# Patient Record
Sex: MALE | Race: WHITE | NOT HISPANIC OR LATINO | Employment: FULL TIME | URBAN - METROPOLITAN AREA
[De-identification: names, ages, dates, MRNs, and addresses within clinical notes are randomized per-mention and may not be internally consistent; named-entity substitution may affect disease eponyms.]

---

## 2017-02-23 ENCOUNTER — HOSPITAL ENCOUNTER (OUTPATIENT)
Dept: RADIOLOGY | Facility: HOSPITAL | Age: 52
Discharge: HOME | End: 2017-02-23
Attending: INTERNAL MEDICINE
Payer: COMMERCIAL

## 2017-02-23 ENCOUNTER — OFFICE VISIT (OUTPATIENT)
Dept: PULMONOLOGY | Facility: CLINIC | Age: 52
End: 2017-02-23
Payer: COMMERCIAL

## 2017-02-23 VITALS
OXYGEN SATURATION: 98 % | HEIGHT: 72 IN | DIASTOLIC BLOOD PRESSURE: 82 MMHG | WEIGHT: 222.88 LBS | SYSTOLIC BLOOD PRESSURE: 138 MMHG | HEART RATE: 84 BPM | RESPIRATION RATE: 12 BRPM | BODY MASS INDEX: 30.19 KG/M2

## 2017-02-23 DIAGNOSIS — R91.1 LUNG NODULE: ICD-10-CM

## 2017-02-23 DIAGNOSIS — J45.20 CHRONIC ASTHMA, MILD INTERMITTENT, UNCOMPLICATED: Primary | ICD-10-CM

## 2017-02-23 DIAGNOSIS — J30.89 PERENNIAL ALLERGIC RHINITIS, UNSPECIFIED ALLERGIC RHINITIS TRIGGER: ICD-10-CM

## 2017-02-23 PROCEDURE — 1160F RVW MEDS BY RX/DR IN RCRD: CPT | Mod: S$GLB,,, | Performed by: INTERNAL MEDICINE

## 2017-02-23 PROCEDURE — 99999 PR PBB SHADOW E&M-EST. PATIENT-LVL III: CPT | Mod: PBBFAC,,, | Performed by: INTERNAL MEDICINE

## 2017-02-23 PROCEDURE — 99213 OFFICE O/P EST LOW 20 MIN: CPT | Mod: S$GLB,,, | Performed by: INTERNAL MEDICINE

## 2017-02-23 PROCEDURE — 71250 CT THORAX DX C-: CPT | Mod: 26,,, | Performed by: RADIOLOGY

## 2017-02-23 PROCEDURE — 71250 CT THORAX DX C-: CPT | Mod: TC

## 2017-02-23 NOTE — PATIENT INSTRUCTIONS
No further CT imaging of chest unless new resp symptoms arise.  Continue Albuterol if needed; no indication for maintenance asthma med at present.  Continue current meds for control of rhinitis.  Return visit if needed.

## 2017-02-23 NOTE — PROGRESS NOTES
Subjective:       Patient ID: Faustino Shook is a 51 y.o. male.    Chief Complaint: Pulmonary Nodules    HPI HISTORY OF PRESENT ILLNESS:  Mr. Shook is a 51-year-old nonsmoker who returns   for followup of a small right lung nodule.  He reports having had a stable   respiratory status since his visit here in August.  He did have a flare of acute   sinusitis around December of last year.  At that time, he was treated with   Augmentin.  He feels that he had a good clinical response.  He has used   albuterol on an as needed schedule for control of intermittent asthma.  He has   not needed this medication during the past several months.  He is   not having any ongoing nighttime respiratory symptoms.  He has not had any   recent upper GI problems.    DATA:  I have reviewed the images from the CT scan of the chest done earlier   today.  There do not appear to be any acute cardiovascular abnormalities.  There   continues to be an oval opacity in the right middle lobe, which is slightly   less than 1 cm in largest diameter.  This appears stable when compared to the   prior CT studies from July of last year and March 2014.  There also is a   localized subpleural streaking lateral to this nodule in the right lung.  This   appears stable on these serial scans also.  Elsewhere smaller subpleural   micronodules are seen, but none show any significant change.      CB/HN  dd: 02/23/2017 20:07:57 (CST)  td: 02/24/2017 11:03:08 (CST)  Doc ID   #[id number]  Job ID #[id number]    CC:       Review of Systems    Objective:      Physical Exam  Personal Diagnostic Review    Pulmonary Function Tests 2/23/2017   SpO2 98   Height 72.000   Weight 3566.16   BMI (Calculated) 30.3         Assessment:       1. Chronic asthma, mild intermittent, uncomplicated    2. Lung nodule    3. Perennial allergic rhinitis, unspecified allergic rhinitis trigger        Outpatient Encounter Prescriptions as of 2/23/2017   Medication Sig Dispense Refill     albuterol (PROVENTIL) 2.5 mg /3 mL (0.083 %) nebulizer solution Take 3 mLs (2.5 mg total) by nebulization every 6 (six) hours as needed for Wheezing. 50 Box 1    albuterol 90 mcg/actuation inhaler Inhale 2 puffs into the lungs every 6 (six) hours as needed. 18 g 1    albuterol sulfate 2.5 mg/0.5 mL Nebu Take 2.5 mg by nebulization every 6 (six) hours.      chlorproMAZINE (THORAZINE) 25 MG tablet Take 1-2 tablets (25-50 mg total) by mouth 3 (three) times daily. Stop taking when hiccups stop occuring. 60 tablet 0    esomeprazole (NEXIUM) 40 MG capsule Take 1 capsule (40 mg total) by mouth before breakfast. For Acid Reflux. 30 capsule 1    fluticasone (FLONASE) 50 mcg/actuation nasal spray 2 sprays by Each Nare route once daily. 16 g 11    ipratropium (ATROVENT) 0.06 % nasal spray 2 sprays by Nasal route as directed.       No facility-administered encounter medications on file as of 2/23/2017.      No orders of the defined types were placed in this encounter.    Plan:     No further CT imaging of chest unless new resp symptoms arise.  Continue Albuterol if needed; no indication for maintenance asthma med at present.  Continue current meds for control of rhinitis.  Return visit if needed.    NOTE:  16 min visit with all time counseling regarding CT findings and asthma management.

## 2017-03-24 ENCOUNTER — OFFICE VISIT (OUTPATIENT)
Dept: INTERNAL MEDICINE | Facility: CLINIC | Age: 52
End: 2017-03-24
Payer: COMMERCIAL

## 2017-03-24 VITALS
HEART RATE: 74 BPM | WEIGHT: 236.13 LBS | TEMPERATURE: 98 F | BODY MASS INDEX: 31.98 KG/M2 | SYSTOLIC BLOOD PRESSURE: 130 MMHG | HEIGHT: 72 IN | DIASTOLIC BLOOD PRESSURE: 64 MMHG | OXYGEN SATURATION: 97 %

## 2017-03-24 DIAGNOSIS — J06.9 URI WITH COUGH AND CONGESTION: Primary | ICD-10-CM

## 2017-03-24 DIAGNOSIS — J45.20 CHRONIC ASTHMA, MILD INTERMITTENT, UNCOMPLICATED: ICD-10-CM

## 2017-03-24 PROCEDURE — 1160F RVW MEDS BY RX/DR IN RCRD: CPT | Mod: S$GLB,,, | Performed by: INTERNAL MEDICINE

## 2017-03-24 PROCEDURE — 99999 PR PBB SHADOW E&M-EST. PATIENT-LVL III: CPT | Mod: PBBFAC,,, | Performed by: INTERNAL MEDICINE

## 2017-03-24 PROCEDURE — 99214 OFFICE O/P EST MOD 30 MIN: CPT | Mod: S$GLB,,, | Performed by: INTERNAL MEDICINE

## 2017-03-24 RX ORDER — NEOMYCIN SULFATE, POLYMYXIN B SULFATE AND DEXAMETHASONE 3.5; 10000; 1 MG/ML; [USP'U]/ML; MG/ML
SUSPENSION/ DROPS OPHTHALMIC
Refills: 0 | COMMUNITY
Start: 2017-03-13 | End: 2018-08-21

## 2017-03-24 RX ORDER — BENZONATATE 100 MG/1
100 CAPSULE ORAL 3 TIMES DAILY PRN
Qty: 30 CAPSULE | Refills: 0 | Status: SHIPPED | OUTPATIENT
Start: 2017-03-24 | End: 2017-04-03

## 2017-03-24 NOTE — MR AVS SNAPSHOT
[facility] - Internal Medicine  [address]  [place] [address]  Phone: [phone]  Fax: [phone]                  Faustino Shook   3/24/2017 11:00 AM   Office Visit    Description:  Male : 1965   Provider:  Steph Laureano MD   Department:  [facility] - Internal Medicine           Reason for Visit     URI           Diagnoses this Visit        Comments    URI with cough and congestion    -  Primary            To Do List           Goals (5 Years of Data)     None      Follow-Up and Disposition     Return if symptoms worsen or fail to improve.       These Medications        Disp Refills Start End    benzonatate (TESSALON) 100 MG capsule 30 capsule 0 3/24/2017 4/3/2017    Take 1 capsule (100 mg total) by mouth 3 (three) times daily as needed for Cough. - Oral    Pharmacy: [facility]/pharmacy #[id number] - [place] - [address] [facility] #: [phone]         [facility]     [facility] Care [facility] -  Assistance  Registered nurses in the [facility] provide clinical advisement, health education, appointment booking, and other advisory services.  Call for this free service at [phone].             Medications           Message regarding Medications     Verify the changes and/or additions to your medication regime listed below are the same as discussed with your clinician today.  If any of these changes or additions are incorrect, please notify your healthcare provider.        START taking these NEW medications        Refills    benzonatate (TESSALON) 100 MG capsule 0    Sig: Take 1 capsule (100 mg total) by mouth 3 (three) times daily as needed for Cough.    Class: Normal    Route: Oral           Verify that the below list of medications is an accurate representation of the medications you are currently taking.  If none reported, the list may be blank. If incorrect, please contact your healthcare provider. Carry this list with you in case of  emergency.           Current Medications     albuterol (PROVENTIL) 2.5 mg /3 mL (0.083 %) nebulizer solution Take 3 mLs (2.5 mg total) by nebulization every 6 (six) hours as needed for Wheezing.    albuterol 90 mcg/actuation inhaler Inhale 2 puffs into the lungs every 6 (six) hours as needed.    albuterol sulfate 2.5 mg/0.5 mL Nebu Take 2.5 mg by nebulization every 6 (six) hours.    chlorproMAZINE (THORAZINE) 25 MG tablet Take 1-2 tablets (25-50 mg total) by mouth 3 (three) times daily. Stop taking when hiccups stop occuring.    fluticasone (FLONASE) 50 mcg/actuation nasal spray 2 sprays by Each Nare route once daily.    ipratropium (ATROVENT) 0.06 % nasal spray 2 sprays by Nasal route as directed.    neomycin-polymyxin-dexamethasone (MAXITROL) 3.5mg/mL-10,000 unit/mL-0.1 % DrpS PUT 1 DROP INTO LEFT EYE 4 TIMES A DAY    benzonatate (TESSALON) 100 MG capsule Take 1 capsule (100 mg total) by mouth 3 (three) times daily as needed for Cough.    esomeprazole (NEXIUM) 40 MG capsule Take 1 capsule (40 mg total) by mouth before breakfast. For Acid Reflux.           Clinical Reference Information           Your Vitals Were     BP Pulse Temp Height Weight SpO2    130/64 (BP Location: Left arm, Patient Position: Sitting) 74 98.1 °F (36.7 °C) 6' (1.829 m) 107.1 kg (236 lb 1.8 oz) 97%    BMI                32.02 kg/m2          Blood Pressure          Most Recent Value    BP  130/64      Allergies as of 3/24/2017     No Known Allergies      Immunizations Administered on Date of Encounter - 3/24/2017     None      Instructions    Nasal decongestant:  1. Sudafed (pseudoephedrine or phenylephrine), 2. Afrin nasal spray (oxymetazoline) - do not use nasal spray longer than 3 days, 3. Sinus rinses    Cough suppressant:  1. Dextromethorphan, 2. Also sending Tessalon Perles to your pharmacy.    Expectorant:  Guaifenesin     Good combination pill options:  Revert and NyQuil       Language Assistance Services     ATTENTION: Language  assistance services are available, free of charge. Please call [phone].      ATENCIÓN: Si habla octavia, tiene a riddle disposición servicios gratuitos de asistencia lingüística. Llame al [phone].     CHÚ Ý: N?u b?n nói Ti?ng Vi?t, có các d?ch v? h? tr? ngôn ng? mi?n phí dành cho b?n. G?i s? [phone].         [facility] complies with applicable Federal civil rights laws and does not discriminate on the basis of race, color, national origin, age, disability, or sex.

## 2017-03-24 NOTE — PROGRESS NOTES
Internal Medicine    Subjective:      Patient ID: Faustino Shook is a 51 y.o. male.    Chief Complaint: URI    HPI Comments: Presents for urgent visit for cough and cold symptoms.  Has mild intermittent asthma.      URI with cough:  All his children have been sick recently and were given Z-packs by Urgent Care.  3 days ago the patient started having cold symptoms - sinus congestion, runny nose, cough, and mild SOB.  He has mild intermittent asthma, and always has to start using his inhaler when he gets sick.  He is using albuterol 4 times per day with good relief and no shortness of breath or wheezing currently.  He did feel feverish last night but did not check his temperature.  He is using Flonase and Zyrtec without much relief.  He denies ear pain, sore throat, throat swelling, post-nasal drip, or purulent nasal discharge.      Review of Systems   Constitutional: Negative for appetite change, chills, fatigue, fever and unexpected weight change.   HENT: Positive for congestion. Negative for ear pain, hearing loss, postnasal drip, rhinorrhea, sinus pressure, sneezing, sore throat and trouble swallowing.    Eyes: Negative for visual disturbance.   Respiratory: Positive for cough (Produtive with some yellow sputum). Negative for chest tightness, shortness of breath and wheezing.    Cardiovascular: Negative for chest pain, palpitations and leg swelling.   Gastrointestinal: Negative for abdominal pain.   Skin: Negative for rash.   Neurological: Negative for dizziness, weakness, numbness and headaches.   Hematological: Negative for adenopathy.   Psychiatric/Behavioral: Negative for behavioral problems.       Past medical history, surgical history, and family medical history reviewed and updated as appropriate.    Medications and allergies reviewed.     Objective:     /64 (BP Location: Left arm, Patient Position: Sitting)  Pulse 74  Temp 98.1 °F (36.7 °C)  Ht 6' (1.829 m)  Wt 107.1 kg (236 lb 1.8 oz)   SpO2 97%  BMI 32.02 kg/m2  Physical Exam   Constitutional: He is oriented to person, place, and time. He appears well-developed and well-nourished. No distress.   HENT:   Head: Normocephalic and atraumatic.   TM's clear bilaterally.  External ear canals clear.  Oropharynx with slight erythema bilaterally but no swelling or tonsillar exudate.   Eyes: Conjunctivae and EOM are normal.   Neck: No thyromegaly present.   Cardiovascular: Normal rate and regular rhythm.  Exam reveals no gallop and no friction rub.    No murmur heard.  Pulmonary/Chest: Effort normal and breath sounds normal. No respiratory distress. He has no wheezes. He has no rales.   Musculoskeletal: He exhibits no edema or tenderness.   Lymphadenopathy:     He has no cervical adenopathy.   Neurological: He is alert and oriented to person, place, and time.   Skin: No rash noted. No erythema.   Psychiatric: He has a normal mood and affect. His behavior is normal.       RESULTS:  Lab Results   Component Value Date    WBC 3.60 (L) 07/06/2016    HGB 16.2 07/06/2016    HCT 44.6 07/06/2016    MCV 84 07/06/2016     (L) 07/06/2016     BMP  Lab Results   Component Value Date     07/06/2016    K 3.5 07/06/2016     07/06/2016    CO2 26 07/06/2016    BUN 12 07/06/2016    CREATININE 1.2 07/06/2016    CALCIUM 9.0 07/06/2016    ANIONGAP 11 07/06/2016    ESTGFRAFRICA >60.0 07/06/2016    EGFRNONAA >60.0 07/06/2016         Assessment:     1. URI with cough and congestion    2. Chronic asthma, mild intermittent, uncomplicated        Plan:   Faustino was seen today for uri.    Diagnoses and all orders for this visit:    URI with cough and congestion   Likely viral in origin.  Recommended over-the-counter options for symptom relief.  Patient agrees to notify me if symptoms worsen.  He will be leaving for [place] next Thursday.  -     benzonatate (TESSALON) 100 MG capsule; Take 1 capsule (100 mg total) by mouth 3 (three) times daily as needed for  Cough.    Chronic asthma, mild intermittent, uncomplicated    Worsening of symptoms due to current viral illness.  No shortness of breath or wheezing today.  Sating well on room air.  Continue albuterol as needed.    Return if symptoms worsen or fail to improve.    Steph Laureano MD  Internal Medicine  [facility]

## 2017-03-24 NOTE — PATIENT INSTRUCTIONS
Nasal decongestant:  1. Sudafed (pseudoephedrine or phenylephrine), 2. Afrin nasal spray (oxymetazoline) - do not use nasal spray longer than 3 days, 3. Sinus rinses    Cough suppressant:  1. Dextromethorphan, 2. Also sending Tessalon Perles to your pharmacy.    Expectorant:  Guaifenesin     Good combination pill options:  DayQil and NyQuil

## 2017-05-04 ENCOUNTER — TELEPHONE (OUTPATIENT)
Dept: OTOLARYNGOLOGY | Facility: CLINIC | Age: 52
End: 2017-05-04

## 2017-05-04 ENCOUNTER — OFFICE VISIT (OUTPATIENT)
Dept: OTOLARYNGOLOGY | Facility: CLINIC | Age: 52
End: 2017-05-04
Payer: COMMERCIAL

## 2017-05-04 VITALS
BODY MASS INDEX: 31.99 KG/M2 | SYSTOLIC BLOOD PRESSURE: 158 MMHG | DIASTOLIC BLOOD PRESSURE: 75 MMHG | WEIGHT: 235.88 LBS

## 2017-05-04 DIAGNOSIS — K13.79 ELONGATED UVULA, ACQUIRED: ICD-10-CM

## 2017-05-04 DIAGNOSIS — H61.23 IMPACTED CERUMEN OF BOTH EARS: ICD-10-CM

## 2017-05-04 DIAGNOSIS — G47.30 SLEEP-DISORDERED BREATHING: ICD-10-CM

## 2017-05-04 DIAGNOSIS — J34.2 DEVIATED SEPTUM: ICD-10-CM

## 2017-05-04 DIAGNOSIS — J34.3 NASAL TURBINATE HYPERTROPHY: ICD-10-CM

## 2017-05-04 DIAGNOSIS — K14.8 ENLARGED TONGUE: ICD-10-CM

## 2017-05-04 DIAGNOSIS — H93.8X3 SENSATION OF FULLNESS IN BOTH EARS: Primary | ICD-10-CM

## 2017-05-04 PROCEDURE — 69210 REMOVE IMPACTED EAR WAX UNI: CPT | Mod: S$GLB,,, | Performed by: NURSE PRACTITIONER

## 2017-05-04 PROCEDURE — 99999 PR PBB SHADOW E&M-EST. PATIENT-LVL III: CPT | Mod: PBBFAC,,, | Performed by: NURSE PRACTITIONER

## 2017-05-04 PROCEDURE — 1160F RVW MEDS BY RX/DR IN RCRD: CPT | Mod: S$GLB,,, | Performed by: NURSE PRACTITIONER

## 2017-05-04 PROCEDURE — 99213 OFFICE O/P EST LOW 20 MIN: CPT | Mod: 25,S$GLB,, | Performed by: NURSE PRACTITIONER

## 2017-05-04 RX ORDER — AMOXICILLIN AND CLAVULANATE POTASSIUM 875; 125 MG/1; MG/1
1 TABLET, FILM COATED ORAL 2 TIMES DAILY
Refills: 0 | COMMUNITY
Start: 2017-03-29 | End: 2018-08-21 | Stop reason: ALTCHOICE

## 2017-05-04 RX ORDER — CLINDAMYCIN HYDROCHLORIDE 300 MG/1
300 CAPSULE ORAL 3 TIMES DAILY
Refills: 0 | COMMUNITY
Start: 2017-03-29 | End: 2018-08-21

## 2017-05-04 NOTE — MR AVS SNAPSHOT
[facility] - Otorhinolaryngology  [address]  [place] [address]  Phone: [phone]  Fax: [phone]                  Faustino Shook   2017 3:45 PM   Office Visit    Description:  Male : 1965   Provider:  Samy Doty NP   Department:  [facility] - Otorhinolaryngology           Reason for Visit     Cerumen Impaction     Ear Fullness           Diagnoses this Visit        Comments    Sensation of fullness in both ears    -  Primary     Impacted cerumen of both ears         Sleep-disordered breathing         Deviated septum         Nasal turbinate hypertrophy         Elongated uvula, acquired         Enlarged tongue                To Do List           Goals (5 Years of Data)     None      Follow-Up and Disposition     Return if symptoms worsen or fail to improve.      [facility]     [facility] On Call Nurse Care Line -  Assistance  Unless otherwise directed by your provider, please contact [facility], our nurse care line that is available for  assistance.     Registered nurses in the [facility] provide: appointment scheduling, clinical advisement, health education, and other advisory services.  Call: [phone] (toll free)               Medications           Message regarding Medications     Verify the changes and/or additions to your medication regime listed below are the same as discussed with your clinician today.  If any of these changes or additions are incorrect, please notify your healthcare provider.             Verify that the below list of medications is an accurate representation of the medications you are currently taking.  If none reported, the list may be blank. If incorrect, please contact your healthcare provider. Carry this list with you in case of emergency.           Current Medications     albuterol (PROVENTIL) 2.5 mg /3 mL (0.083 %) nebulizer solution Take 3 mLs (2.5 mg total) by nebulization every 6 (six) hours as needed  for Wheezing.    albuterol 90 mcg/actuation inhaler Inhale 2 puffs into the lungs every 6 (six) hours as needed.    albuterol sulfate 2.5 mg/0.5 mL Nebu Take 2.5 mg by nebulization every 6 (six) hours.    amoxicillin-clavulanate 875-125mg (AUGMENTIN) 875-125 mg per tablet Take 1 tablet by mouth 2 (two) times daily.    chlorproMAZINE (THORAZINE) 25 MG tablet Take 1-2 tablets (25-50 mg total) by mouth 3 (three) times daily. Stop taking when hiccups stop occuring.    clindamycin (CLEOCIN) 300 MG capsule 300 mg 3 (three) times daily.    esomeprazole (NEXIUM) 40 MG capsule Take 1 capsule (40 mg total) by mouth before breakfast. For Acid Reflux.    fluticasone (FLONASE) 50 mcg/actuation nasal spray 2 sprays by Each Nare route once daily.    ipratropium (ATROVENT) 0.06 % nasal spray 2 sprays by Nasal route as directed.    neomycin-polymyxin-dexamethasone (MAXITROL) 3.5mg/mL-10,000 unit/mL-0.1 % DrpS PUT 1 DROP INTO LEFT EYE 4 TIMES A DAY           Clinical Reference Information           Your Vitals Were     BP Weight BMI          158/75 (BP Location: Right arm, Patient Position: Sitting, BP Method: Automatic) 107 kg (235 lb 14.3 oz) 31.99 kg/m2        Blood Pressure          Most Recent Value    BP  (!)  158/75      Allergies as of 5/4/2017     No Known Allergies      Immunizations Administered on Date of Encounter - 5/4/2017     None      Instructions    Referred to Dr. Mckeon for snoring/sleep apnea.  Hearing conservation strongly recommended.  F/U with PCP as per schedule.  RTC prn.         Language Assistance Services     ATTENTION: Language assistance services are available, free of charge. Please call [phone].      ATENCIÓN: Si tessla octavia, tiene a riddle disposición servicios gratuitos de asistencia lingüística. Llame al [phone].     CHÚ Ý: N?u b?n nói Ti?ng Vi?t, có các d?ch v? h? tr? ngôn ng? mi?n phí dành cho b?n. G?i s? [phone].         [facility] complies with applicable  Federal civil rights laws and does not discriminate on the basis of race, color, national origin, age, disability, or sex.

## 2017-05-04 NOTE — PATIENT INSTRUCTIONS
Referred to Dr. Mckeon for snoring/sleep apnea.  Hearing conservation strongly recommended.  F/U with PCP as per schedule.  RTC prn.

## 2017-05-04 NOTE — TELEPHONE ENCOUNTER
----- Message from Tessy Marie sent at 5/4/2017  9:56 AM CDT -----  Contact: Pt [phone]  Pt called to speak to the nurse to request an appt with the provider today due to wax build up, ringing in his ear and echoing. Pt has seen the provider previously and would like a call back from the nurse.    Pt can be reached [phone].    Thanks

## 2017-05-06 NOTE — PROGRESS NOTES
Subjective:       Patient ID: Faustino Shook is a 52 y.o. male.    Chief Complaint: Cerumen Impaction and Ear Fullness    Ear Fullness    There is pain in both ears. This is a recurrent problem. The current episode started more than 1 year ago. The problem occurs constantly. There has been no fever. The pain is at a severity of 0/10. The patient is experiencing no pain. Associated symptoms include hearing loss. Pertinent negatives include no abdominal pain, coughing, diarrhea, ear discharge, headaches, neck pain, rash, rhinorrhea, sore throat or vomiting. He has tried nothing for the symptoms. His past medical history is significant for hearing loss. There is no history of a chronic ear infection or a tympanostomy tube.        Past Medical History: Patient has a past medical history of Allergy; Asthma; and Trauma to right eye (2007).    Past Surgical History: Patient has a past surgical history that includes Knee surgery (Right).    Social History: Patient reports that he has never smoked. He has never used smokeless tobacco. He reports that he does not drink alcohol.    Family History: family history includes Asthma in his mother; No Known Problems in his brother, father, maternal aunt, maternal grandfather, maternal grandmother, maternal uncle, paternal aunt, paternal grandfather, paternal grandmother, paternal uncle, and sister. There is no history of Amblyopia, Blindness, Cancer, Cataracts, Diabetes, Glaucoma, Hypertension, Macular degeneration, Retinal detachment, Strabismus, Stroke, Thyroid disease, or Emphysema.    Medications:   Current Outpatient Prescriptions   Medication Sig    albuterol (PROVENTIL) 2.5 mg /3 mL (0.083 %) nebulizer solution Take 3 mLs (2.5 mg total) by nebulization every 6 (six) hours as needed for Wheezing.    albuterol 90 mcg/actuation inhaler Inhale 2 puffs into the lungs every 6 (six) hours as needed.    albuterol sulfate 2.5 mg/0.5 mL Nebu Take 2.5 mg by nebulization  every 6 (six) hours.    amoxicillin-clavulanate 875-125mg (AUGMENTIN) 875-125 mg per tablet Take 1 tablet by mouth 2 (two) times daily.    chlorproMAZINE (THORAZINE) 25 MG tablet Take 1-2 tablets (25-50 mg total) by mouth 3 (three) times daily. Stop taking when hiccups stop occuring.    clindamycin (CLEOCIN) 300 MG capsule 300 mg 3 (three) times daily.    esomeprazole (NEXIUM) 40 MG capsule Take 1 capsule (40 mg total) by mouth before breakfast. For Acid Reflux.    fluticasone (FLONASE) 50 mcg/actuation nasal spray 2 sprays by Each Nare route once daily.    ipratropium (ATROVENT) 0.06 % nasal spray 2 sprays by Nasal route as directed.    neomycin-polymyxin-dexamethasone (MAXITROL) 3.5mg/mL-10,000 unit/mL-0.1 % DrpS PUT 1 DROP INTO LEFT EYE 4 TIMES A DAY     No current facility-administered medications for this visit.        Allergies: Patient has No Known Allergies.    Review of Systems   Constitutional: Negative for activity change, fatigue, fever and unexpected weight change.   HENT: Positive for hearing loss. Negative for congestion, dental problem, ear discharge, ear pain, facial swelling, mouth sores, nosebleeds, postnasal drip, rhinorrhea, sinus pressure, sneezing, sore throat, tinnitus, trouble swallowing and voice change.    Eyes: Negative for pain and visual disturbance.   Respiratory: Negative for cough, choking, chest tightness, shortness of breath, wheezing and stridor.         Snoring.   Cardiovascular: Negative for chest pain.   Gastrointestinal: Negative for abdominal pain, diarrhea, nausea and vomiting.   Musculoskeletal: Negative for gait problem, neck pain and neck stiffness.   Skin: Negative for color change, rash and wound.   Allergic/Immunologic: Negative for environmental allergies.   Neurological: Negative for dizziness, seizures, syncope, facial asymmetry, speech difficulty, weakness, light-headedness, numbness and headaches.   Psychiatric/Behavioral: Negative for agitation, confusion  and decreased concentration. The patient is not nervous/anxious.        Objective:       BP (!) 158/75 (BP Location: Right arm, Patient Position: Sitting, BP Method: Automatic)  Wt 107 kg (235 lb 14.3 oz)  BMI 31.99 kg/m2    Physical Exam   Constitutional: He is oriented to person, place, and time. He appears well-developed and well-nourished.   HENT:   Head: Normocephalic and atraumatic. Not macrocephalic and not microcephalic. Head is without raccoon's eyes, without Arriaga's sign, without abrasion, without contusion, without laceration, without right periorbital erythema and without left periorbital erythema. Hair is normal.   Right Ear: No lacerations. No drainage, swelling or tenderness. No foreign bodies. No mastoid tenderness. Tympanic membrane is not injected, not scarred, not perforated, not erythematous, not retracted and not bulging. Tympanic membrane mobility is normal. No middle ear effusion. No hemotympanum. Decreased hearing is noted.   Left Ear: No lacerations. No drainage, swelling or tenderness. No foreign bodies. No mastoid tenderness. Tympanic membrane is not injected, not scarred, not perforated, not erythematous, not retracted and not bulging. Tympanic membrane mobility is normal.  No middle ear effusion. No hemotympanum. Decreased hearing is noted.   Nose: Septal deviation present. No mucosal edema, rhinorrhea, nose lacerations, sinus tenderness, nasal deformity or nasal septal hematoma. No epistaxis.  No foreign bodies. Right sinus exhibits no maxillary sinus tenderness and no frontal sinus tenderness. Left sinus exhibits no maxillary sinus tenderness and no frontal sinus tenderness.   Mouth/Throat: Uvula is midline.     PROCEDURE NOTE:  Ceruminosis is noted in both EACs.  Wax was removed by manual debridement and suctioning utilizing the assistance of binocular microscopy revealing EACs and TMs WNL. The patient tolerated this procedure without difficulty. The subjective decrease noted in  hearing pre-cleaning was resolved post-cleaning.       Eyes: Conjunctivae, EOM and lids are normal. Pupils are equal, round, and reactive to light.   Neck: Trachea normal and normal range of motion. Neck supple. No spinous process tenderness and no muscular tenderness present. No rigidity. No edema, no erythema and normal range of motion present. No thyroid mass and no thyromegaly present.   Pulmonary/Chest: Effort normal.   Abdominal: Soft.   Musculoskeletal: Normal range of motion.   Lymphadenopathy:        Head (right side): No submental, no submandibular, no tonsillar, no preauricular and no posterior auricular adenopathy present.        Head (left side): No submental, no submandibular, no tonsillar, no preauricular, no posterior auricular and no occipital adenopathy present.     He has no cervical adenopathy.   Neurological: He is alert and oriented to person, place, and time. No cranial nerve deficit or sensory deficit.   Skin: Skin is warm and dry.   Psychiatric: He has a normal mood and affect. His behavior is normal. Judgment and thought content normal.   Nursing note and vitals reviewed.        Assessment:       1. Sensation of fullness in both ears    2. Impacted cerumen of both ears    3. Sleep-disordered breathing    4. Deviated septum    5. Nasal turbinate hypertrophy    6. Elongated uvula, acquired    7. Enlarged tongue        Plan:       Schedule appt. With Dr. Mckeon (snoring, deviated septum)  Hearing conservation strongly recommended.  Trial of amplification is not currently indicated.  Re-check of hearing after 52 years of age.  F/U with PCP as per schedule.  RTC prn.

## 2017-05-25 ENCOUNTER — OFFICE VISIT (OUTPATIENT)
Dept: OTOLARYNGOLOGY | Facility: CLINIC | Age: 52
End: 2017-05-25
Payer: COMMERCIAL

## 2017-05-25 VITALS
BODY MASS INDEX: 31.26 KG/M2 | HEIGHT: 73 IN | WEIGHT: 235.88 LBS | SYSTOLIC BLOOD PRESSURE: 156 MMHG | DIASTOLIC BLOOD PRESSURE: 82 MMHG

## 2017-05-25 DIAGNOSIS — H69.91 EUSTACHIAN TUBE DYSFUNCTION, RIGHT: ICD-10-CM

## 2017-05-25 DIAGNOSIS — H93.8X1 EAR PRESSURE, RIGHT: Primary | ICD-10-CM

## 2017-05-25 PROCEDURE — 99213 OFFICE O/P EST LOW 20 MIN: CPT | Mod: S$GLB,,, | Performed by: NURSE PRACTITIONER

## 2017-05-25 PROCEDURE — 99999 PR PBB SHADOW E&M-EST. PATIENT-LVL III: CPT | Mod: PBBFAC,,, | Performed by: NURSE PRACTITIONER

## 2017-05-25 RX ORDER — PREDNISONE 20 MG/1
20 TABLET ORAL DAILY
Qty: 5 TABLET | Refills: 0 | Status: SHIPPED | OUTPATIENT
Start: 2017-05-25 | End: 2017-05-30

## 2017-05-25 NOTE — PATIENT INSTRUCTIONS
Politzerization attempted but unsuccessful.  Prednisone 20 mg po x5 days.  OTC Flonase BID (spray laterally).  Patient Deferred Audiogram.  Ears and Altitude pamphlet provided.  F/U with PCP as per schedule.  RTC prn or sooner.

## 2017-05-30 ENCOUNTER — HOSPITAL ENCOUNTER (EMERGENCY)
Facility: HOSPITAL | Age: 52
Discharge: HOME | End: 2017-05-30
Attending: EMERGENCY MEDICINE
Payer: COMMERCIAL

## 2017-05-30 VITALS
BODY MASS INDEX: 28.44 KG/M2 | HEART RATE: 66 BPM | SYSTOLIC BLOOD PRESSURE: 129 MMHG | OXYGEN SATURATION: 98 % | WEIGHT: 210 LBS | HEIGHT: 72 IN | TEMPERATURE: 98 F | DIASTOLIC BLOOD PRESSURE: 60 MMHG | RESPIRATION RATE: 14 BRPM

## 2017-05-30 DIAGNOSIS — R61 DIAPHORESIS: ICD-10-CM

## 2017-05-30 DIAGNOSIS — H81.10 BPPV (BENIGN PAROXYSMAL POSITIONAL VERTIGO), UNSPECIFIED LATERALITY: Primary | ICD-10-CM

## 2017-05-30 LAB
ALBUMIN SERPL BCP-MCNC: 3.7 G/DL
ALP SERPL-CCNC: 54 U/L
ALT SERPL W/O P-5'-P-CCNC: 48 U/L
ANION GAP SERPL CALC-SCNC: 11 MMOL/L
AST SERPL-CCNC: 22 U/L
BASOPHILS # BLD AUTO: 0.02 K/UL
BASOPHILS NFR BLD: 0.2 %
BILIRUB SERPL-MCNC: 0.6 MG/DL
BUN SERPL-MCNC: 12 MG/DL
CALCIUM SERPL-MCNC: 8.9 MG/DL
CHLORIDE SERPL-SCNC: 103 MMOL/L
CO2 SERPL-SCNC: 24 MMOL/L
CREAT SERPL-MCNC: 1 MG/DL
DIFFERENTIAL METHOD: ABNORMAL
EOSINOPHIL # BLD AUTO: 0.1 K/UL
EOSINOPHIL NFR BLD: 1.2 %
ERYTHROCYTE [DISTWIDTH] IN BLOOD BY AUTOMATED COUNT: 13.8 %
EST. GFR  (AFRICAN AMERICAN): >60 ML/MIN/1.73 M^2
EST. GFR  (NON AFRICAN AMERICAN): >60 ML/MIN/1.73 M^2
GLUCOSE SERPL-MCNC: 128 MG/DL
HCT VFR BLD AUTO: 51.6 %
HGB BLD-MCNC: 18.2 G/DL
LYMPHOCYTES # BLD AUTO: 0.9 K/UL
LYMPHOCYTES NFR BLD: 10.4 %
MCH RBC QN AUTO: 30.4 PG
MCHC RBC AUTO-ENTMCNC: 35.3 %
MCV RBC AUTO: 86 FL
MONOCYTES # BLD AUTO: 0.5 K/UL
MONOCYTES NFR BLD: 5.1 %
NEUTROPHILS # BLD AUTO: 7.5 K/UL
NEUTROPHILS NFR BLD: 82.9 %
PLATELET # BLD AUTO: 168 K/UL
PMV BLD AUTO: 9.6 FL
POTASSIUM SERPL-SCNC: 3.7 MMOL/L
PROT SERPL-MCNC: 6.8 G/DL
RBC # BLD AUTO: 5.98 M/UL
SODIUM SERPL-SCNC: 138 MMOL/L
TROPONIN I SERPL DL<=0.01 NG/ML-MCNC: 0.02 NG/ML
WBC # BLD AUTO: 9.04 K/UL

## 2017-05-30 PROCEDURE — 25000003 PHARM REV CODE 250: Performed by: PHYSICIAN ASSISTANT

## 2017-05-30 PROCEDURE — 85025 COMPLETE CBC W/AUTO DIFF WBC: CPT

## 2017-05-30 PROCEDURE — 96360 HYDRATION IV INFUSION INIT: CPT

## 2017-05-30 PROCEDURE — 99285 EMERGENCY DEPT VISIT HI MDM: CPT | Mod: ,,, | Performed by: PHYSICIAN ASSISTANT

## 2017-05-30 PROCEDURE — 99284 EMERGENCY DEPT VISIT MOD MDM: CPT | Mod: 25

## 2017-05-30 PROCEDURE — 93005 ELECTROCARDIOGRAM TRACING: CPT

## 2017-05-30 PROCEDURE — 93010 ELECTROCARDIOGRAM REPORT: CPT | Mod: ,,, | Performed by: INTERNAL MEDICINE

## 2017-05-30 PROCEDURE — 84484 ASSAY OF TROPONIN QUANT: CPT

## 2017-05-30 PROCEDURE — 80053 COMPREHEN METABOLIC PANEL: CPT

## 2017-05-30 RX ORDER — MECLIZINE HYDROCHLORIDE 25 MG/1
25 TABLET ORAL 3 TIMES DAILY PRN
Qty: 20 TABLET | Refills: 0 | Status: SHIPPED | OUTPATIENT
Start: 2017-05-30

## 2017-05-30 RX ORDER — PROMETHAZINE HYDROCHLORIDE 25 MG/1
25 TABLET ORAL
Status: COMPLETED | OUTPATIENT
Start: 2017-05-30 | End: 2017-05-30

## 2017-05-30 RX ORDER — MECLIZINE HCL 12.5 MG 12.5 MG/1
25 TABLET ORAL
Status: COMPLETED | OUTPATIENT
Start: 2017-05-30 | End: 2017-05-30

## 2017-05-30 RX ORDER — PROMETHAZINE HYDROCHLORIDE 25 MG/1
25 TABLET ORAL EVERY 6 HOURS PRN
Qty: 20 TABLET | Refills: 0 | Status: SHIPPED | OUTPATIENT
Start: 2017-05-30 | End: 2018-08-30

## 2017-05-30 RX ADMIN — SODIUM CHLORIDE 1000 ML: 0.9 INJECTION, SOLUTION INTRAVENOUS at 02:05

## 2017-05-30 RX ADMIN — PROMETHAZINE HYDROCHLORIDE 25 MG: 25 TABLET ORAL at 02:05

## 2017-05-30 RX ADMIN — MECLIZINE 25 MG: 12.5 TABLET ORAL at 02:05

## 2017-05-30 NOTE — ED TRIAGE NOTES
52 year old male presents to the ED for evaluation of dizziness, ringing in ears, and nausea. States that he has been having an ongoing inner ear issue that he has seen ENT for, but the symptoms have gotten progressively worse

## 2017-05-30 NOTE — ED PROVIDER NOTES
"Encounter Date: 5/30/2017    SCRIBE #1 NOTE: I, Ernestine Baltazar, am scribing for, and in the presence of,  Dr. James. I have scribed the following portions of the note - the APC attestation and the EKG reading.       History     Chief Complaint   Patient presents with    Dizziness     Pt reports vertigo accompanied by nausea that began around 0000.    Hearing Loss     Pt reports hearing loss x "couple of weeks". Pt has been seeing MD for incident but tonight has gotten worse.    Tinnitus     Pt reports ringing in right ear x "couple of weeks"     Review of patient's allergies indicates:  No Known Allergies  52-year-old male presents to the ER for evaluation of dizziness, nausea, emesis, diaphoresis.  Patient claims a history over the course of the past few weeks of vertigo.  He is being evaluated by ENT, he has been seen by them twice.  He is not sure of the diagnosis that has been given to him by ENT yet but was told that his symptoms are related to vertigo.  He last saw ENT last week and was given a prescription for steroids that of which he did not take.     Patient states that approximately one hour prior to arrival he awoke out of bed sweating, very dizzy, and nauseous.  He did throw up one time.  He denies any chest pain or shortness of breath.  He denies any head trauma.  He denies any change in vision.          Past Medical History:   Diagnosis Date    Allergy     Asthma     Trauma to right eye 2007    poked in the eye while wrestling      Past Surgical History:   Procedure Laterality Date    KNEE SURGERY Right      Family History   Problem Relation Age of Onset    Asthma Mother     No Known Problems Father     No Known Problems Sister     No Known Problems Brother     No Known Problems Maternal Aunt     No Known Problems Maternal Uncle     No Known Problems Paternal Aunt     No Known Problems Paternal Uncle     No Known Problems Maternal Grandmother     No Known Problems Maternal Grandfather  "    No Known Problems Paternal Grandmother     No Known Problems Paternal Grandfather     Amblyopia Neg Hx     Blindness Neg Hx     Cancer Neg Hx     Cataracts Neg Hx     Diabetes Neg Hx     Glaucoma Neg Hx     Hypertension Neg Hx     Macular degeneration Neg Hx     Retinal detachment Neg Hx     Strabismus Neg Hx     Stroke Neg Hx     Thyroid disease Neg Hx     Emphysema Neg Hx      Social History   Substance Use Topics    Smoking status: Never Smoker    Smokeless tobacco: Never Used    Alcohol use No     Review of Systems   Constitutional: Positive for diaphoresis. Negative for fever.   HENT: Negative for sore throat.    Respiratory: Negative for shortness of breath.    Cardiovascular: Negative for chest pain.   Gastrointestinal: Positive for nausea and vomiting.   Genitourinary: Negative for dysuria.   Musculoskeletal: Negative for back pain.   Skin: Negative for rash.   Neurological: Positive for dizziness. Negative for weakness.   Hematological: Does not bruise/bleed easily.       Physical Exam     Initial Vitals [05/30/17 0059]   BP Pulse Resp Temp SpO2   135/88 72 14 97.6 °F (36.4 °C) 95 %     Physical Exam    Constitutional: Vital signs are normal. He appears well-developed and well-nourished. He is not diaphoretic. No distress.   HENT:   Head: Normocephalic and atraumatic.   Right Ear: External ear normal.   Left Ear: External ear normal.   Normal exam of the R ear   Eyes: Conjunctivae are normal.   PERRLA  EOMI  Pt has bilateral vertical Nystagmus   Cardiovascular: Normal rate, regular rhythm and normal heart sounds. Exam reveals no gallop and no friction rub.    No murmur heard.  Pulmonary/Chest: No respiratory distress. He has no wheezes. He has no rhonchi. He has no rales. He exhibits no tenderness.   Abdominal: Soft. Normal appearance, normal aorta and bowel sounds are normal.   Musculoskeletal: Normal range of motion.   Neurological: He is alert and oriented to person, place, and time.    Patient has horizontal nystagmus bilaterally  [place]pike was not attempted, slight alteration in patient's position causes worsening nausea   Skin: Skin is warm and intact.   Psychiatric: He has a normal mood and affect. His speech is normal and behavior is normal. Cognition and memory are normal.         ED Course   Procedures  Labs Reviewed   CBC W/ AUTO DIFFERENTIAL - Abnormal; Notable for the following:        Result Value    Hemoglobin 18.2 (*)     Lymph # 0.9 (*)     Gran% 82.9 (*)     Lymph% 10.4 (*)     All other components within normal limits   COMPREHENSIVE METABOLIC PANEL - Abnormal; Notable for the following:     Glucose 128 (*)     Alkaline Phosphatase 54 (*)     ALT 48 (*)     All other components within normal limits   TROPONIN I     EKG Readings: (Independently Interpreted)   NSR. No ST elevations or depressions. T wave inverisons inferiorly.          Medical Decision Making:   History:   Old Medical Records: I decided to obtain old medical records.  Differential Diagnosis:   BPPV, acute ear infection,   Independently Interpreted Test(s):   I have ordered and independently interpreted EKG Reading(s) - see prior notes  Clinical Tests:   Lab Tests: Ordered and Reviewed  Medical Tests: Ordered  ED Management:  EKG revealed no acute findings  Patient has no acute findings on his lab work  His symptoms are improved somewhat with Phenergan and meclizine  I recommend follow-up with ENT, Rx given for Phenergan and meclizine to be taken 3 times a day when necessary.   Pt has no acute red flags on exam concerning for acute intracranial process. His presentation is consistent with BPPV            Scribe Attestation:   Scribe #1: I performed the above scribed service and the documentation accurately describes the services I performed. I attest to the accuracy of the note.    Attending Attestation:     Physician Attestation Statement for NP/PA:   I discussed this assessment and plan of this patient with the  NP/PA, but I did not personally examine the patient. The face to face encounter was performed by the NP/PA.    Other NP/PA Attestation Additions:    History of Present Illness: Dizziness.          Physician Attestation for Scribe:  Physician Attestation Statement for Scribe #1: I, Dr. James , reviewed documentation, as scribed by Ernestine Baltazar in my presence, and it is both accurate and complete.                 ED Course     Clinical Impression:   The primary encounter diagnosis was BPPV (benign paroxysmal positional vertigo), unspecified laterality. A diagnosis of Diaphoresis was also pertinent to this visit.    Disposition:   Disposition: Discharged  Condition: Stable       Eligio Castillo PA-C  05/30/17 5814       Rudy James III, MD  05/31/17 4414

## 2017-06-06 ENCOUNTER — CLINICAL SUPPORT (OUTPATIENT)
Dept: AUDIOLOGY | Facility: CLINIC | Age: 52
End: 2017-06-06
Payer: COMMERCIAL

## 2017-06-06 ENCOUNTER — OFFICE VISIT (OUTPATIENT)
Dept: OTOLARYNGOLOGY | Facility: CLINIC | Age: 52
End: 2017-06-06
Payer: COMMERCIAL

## 2017-06-06 VITALS
DIASTOLIC BLOOD PRESSURE: 77 MMHG | TEMPERATURE: 98 F | HEIGHT: 72 IN | WEIGHT: 220.25 LBS | SYSTOLIC BLOOD PRESSURE: 129 MMHG | BODY MASS INDEX: 29.83 KG/M2 | HEART RATE: 80 BPM

## 2017-06-06 DIAGNOSIS — H93.11 TINNITUS, RIGHT EAR: ICD-10-CM

## 2017-06-06 DIAGNOSIS — Z88.9 HISTORY OF SEASONAL ALLERGIES: ICD-10-CM

## 2017-06-06 DIAGNOSIS — R42 VERTIGO: ICD-10-CM

## 2017-06-06 DIAGNOSIS — H90.41 SENSORINEURAL HEARING LOSS (SNHL) OF RIGHT EAR WITH UNRESTRICTED HEARING OF LEFT EAR: Primary | ICD-10-CM

## 2017-06-06 DIAGNOSIS — J45.901 CHRONIC ASTHMA, UNSPECIFIED ASTHMA SEVERITY, WITH ACUTE EXACERBATION: ICD-10-CM

## 2017-06-06 DIAGNOSIS — H93.8X1 EAR PRESSURE, RIGHT: ICD-10-CM

## 2017-06-06 PROCEDURE — 92567 TYMPANOMETRY: CPT | Mod: S$GLB,,, | Performed by: AUDIOLOGIST

## 2017-06-06 PROCEDURE — 92557 COMPREHENSIVE HEARING TEST: CPT | Mod: S$GLB,,, | Performed by: AUDIOLOGIST

## 2017-06-06 PROCEDURE — 99213 OFFICE O/P EST LOW 20 MIN: CPT | Mod: S$GLB,,, | Performed by: OTOLARYNGOLOGY

## 2017-06-06 PROCEDURE — 99999 PR PBB SHADOW E&M-EST. PATIENT-LVL I: CPT | Mod: PBBFAC,,,

## 2017-06-06 PROCEDURE — 99999 PR PBB SHADOW E&M-EST. PATIENT-LVL III: CPT | Mod: PBBFAC,,, | Performed by: OTOLARYNGOLOGY

## 2017-06-06 RX ORDER — PRAVASTATIN SODIUM 20 MG/1
TABLET ORAL
COMMUNITY
Start: 2017-06-01 | End: 2018-08-21

## 2017-06-06 NOTE — PROGRESS NOTES
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  He works out frequently but does not lift heavy weights.  He was recently evaluated by our ENT departmental nurse practitioner 5/4/17 for sensation of fullness in both ears.  Cerumen impactions were removed from each ear at that visit.  He was diagnosis with turbinate hypertrophy and a deviated septum referred to Dr. Mckeon.  The patient was again evaluated by the nurse practitioner 5/25/17 for right ear pressure.  The patient recently admits to forcefully blowing his nose.  Politzerization was attempted ; the patient was prescribed a 5 day course of 20 mg prednisone which he did not fill or take.  Indicates a single episode of vertigo which occurred 1 week ago after he was drinking wine (unusual for him).  He was ataxic going to and from the bathroom in the middle of the night.  He continues to complain of a 6 week symptom of right ear pressure and a feeling as if he is on airplane with ear ringing.  He cannot pop his ear open.    He indicates a "buzzing" tinnitus perception at night in a quiet environment specifically.  He does utilize Viagra; he takes Zyrtec-D for seasonal ALLERGIES.  He has a history of asthma and uses albuterol when necessary.  He travels frequently; once while whitewater rafting the water hit his right ear and it felt blocked for a while.    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  He wears hearing protection.  He receives yearly audiometry.    PE:Blood pressure 129/77 pulse 80 temperature 97.6  height 6 feet weight 220 pounds  Gen.: Alert and oriented gentleman in no acute distress  Both ears were examined under the microscope and the micro-procedure room.  Right eardrum is clear and the right middle ear space is well aerated.  Right mastoid is not tender to touch.    The right pinna is not swollen or cellulitic in " appearance.  There is no rash in the right periauricular area.  Left eardrum is clear left middle ear space is well aerated.  Nasal exam is unremarkable for purulent discharge of either passage.  Oropharyngeal exam is unremarkable for inflammation infection ulceration.  Neck examination is within normal limits.    The patient has completed an audiometric study today results of which are duplicated below and reviewed with the patient in detail.            DIAGNOSIS:     ICD-10-CM ICD-9-CM    1. Sensorineural hearing loss (SNHL) of right ear with unrestricted hearing of left ear H90.41 389.15    2. Ear pressure, right H93.8X1 388.8    3. Tinnitus, right ear H93.11 388.30    4. Vertigo R42 780.4     one episode late at at night after drinking wine   5. Chronic asthma, unspecified asthma severity, with acute exacerbation J45.901 493.92    6. History of seasonal allergies Z88.9 V15.09      PLAN: Audiometry reviewed: AD SNHL  Weight lifting, forceful nose blowing discouraged  Low sodium diet may help  Rx for prednisone 10 mg # 21; 40 mg x 2 days, 30 mg x 2 days 20 mg x 2 days 10 mg x 2 days 5 mg x 2 days     all with food  RTC 2 weeks; repeat audiogram  Consider VNG testing re: vertigo pending course  Withdraw use of Viagra/other for now  Continue Zyrtec (D) use + Flonase for now

## 2017-06-06 NOTE — PATIENT INSTRUCTIONS
Audiometry reviewed: AD SNHL  Weight lifting, forceful nose blowing discouraged  Low sodium diet may help  Rx for prednisone 10 mg # 21; 40 mg x 2 days, 30 mg x 2 days 20 mg x 2 days 10 mg x 2 days 5 mg x 2 days     all with food  RTC 2 weeks; repeat audiogram  Consider VNG testing re: vertigo pending course  Withdraw use of Viagra/other for now  Continue Zyrtec (D) use + Flonase for now

## 2017-06-07 NOTE — PROGRESS NOTES
"Subjective:       Patient ID: Faustino Shook is a 52 y.o. male.    Chief Complaint: Cerumen Impaction    HPI   MrEl Shook is a 52 year old male who presents with a 3 week h/o R ear "feels clogged" and "ear popping." Treatments tried include Zyrtec. He has a h/o snorkeling and diving.    Past Medical History: Patient has a past medical history of Allergy; Asthma; and Trauma to right eye (2007).    Past Surgical History: Patient has a past surgical history that includes Knee surgery (Right).    Social History: Patient reports that he has never smoked. He has never used smokeless tobacco. He reports that he does not drink alcohol.    Family History: family history includes Asthma in his mother; No Known Problems in his brother, father, maternal aunt, maternal grandfather, maternal grandmother, maternal uncle, paternal aunt, paternal grandfather, paternal grandmother, paternal uncle, and sister.    Medications:   Current Outpatient Prescriptions   Medication Sig    albuterol (PROVENTIL) 2.5 mg /3 mL (0.083 %) nebulizer solution Take 3 mLs (2.5 mg total) by nebulization every 6 (six) hours as needed for Wheezing.    albuterol 90 mcg/actuation inhaler Inhale 2 puffs into the lungs every 6 (six) hours as needed.    albuterol sulfate 2.5 mg/0.5 mL Nebu Take 2.5 mg by nebulization every 6 (six) hours.    amoxicillin-clavulanate 875-125mg (AUGMENTIN) 875-125 mg per tablet Take 1 tablet by mouth 2 (two) times daily.    chlorproMAZINE (THORAZINE) 25 MG tablet Take 1-2 tablets (25-50 mg total) by mouth 3 (three) times daily. Stop taking when hiccups stop occuring.    clindamycin (CLEOCIN) 300 MG capsule 300 mg 3 (three) times daily.    esomeprazole (NEXIUM) 40 MG capsule Take 1 capsule (40 mg total) by mouth before breakfast. For Acid Reflux.    fluticasone (FLONASE) 50 mcg/actuation nasal spray 2 sprays by Each Nare route once daily.    ipratropium (ATROVENT) 0.06 % nasal spray 2 sprays by " "Nasal route as directed.    meclizine (ANTIVERT) 25 mg tablet Take 1 tablet (25 mg total) by mouth 3 (three) times daily as needed.    neomycin-polymyxin-dexamethasone (MAXITROL) 3.5mg/mL-10,000 unit/mL-0.1 % DrpS PUT 1 DROP INTO LEFT EYE 4 TIMES A DAY    pravastatin (PRAVACHOL) 20 MG tablet     promethazine (PHENERGAN) 25 MG tablet Take 1 tablet (25 mg total) by mouth every 6 (six) hours as needed for Nausea.     No current facility-administered medications for this visit.        Allergies: Patient has No Known Allergies.    Review of Systems   Constitutional: Negative for activity change, fatigue, fever and unexpected weight change.   HENT: Positive for tinnitus. Negative for congestion, dental problem, ear discharge, ear pain, facial swelling, hearing loss, mouth sores, nosebleeds, postnasal drip, rhinorrhea, sinus pressure, sneezing, sore throat, trouble swallowing and voice change.         R ear popping.  Clogged R ear.   Eyes: Negative for pain and visual disturbance.   Respiratory: Negative for cough, choking, chest tightness, shortness of breath, wheezing and stridor.    Cardiovascular: Negative for chest pain.   Gastrointestinal: Negative for nausea and vomiting.   Musculoskeletal: Negative for gait problem, neck pain and neck stiffness.   Skin: Negative for color change, rash and wound.   Allergic/Immunologic: Negative for environmental allergies.   Neurological: Negative for dizziness, seizures, syncope, facial asymmetry, speech difficulty, weakness, light-headedness, numbness and headaches.   Psychiatric/Behavioral: Negative for agitation, confusion and decreased concentration. The patient is not nervous/anxious.        Objective:       BP (!) 156/82 (BP Location: Right arm, Patient Position: Sitting, BP Method: Automatic)   Ht 6' 1" (1.854 m)   Wt 107 kg (235 lb 14.3 oz)   BMI 31.12 kg/m²     Physical Exam   Constitutional: He is oriented to person, place, and time. He appears well-developed and " well-nourished.   HENT:   Head: Normocephalic and atraumatic. Not macrocephalic and not microcephalic. Head is without raccoon's eyes, without Arriaga's sign, without abrasion, without contusion, without laceration, without right periorbital erythema and without left periorbital erythema. Hair is normal.   Right Ear: Tympanic membrane, external ear and ear canal normal. No lacerations. No drainage, swelling or tenderness. No foreign bodies. No mastoid tenderness. Tympanic membrane is not injected, not scarred, not perforated, not erythematous, not retracted and not bulging. Tympanic membrane mobility is normal. No middle ear effusion. No hemotympanum. No decreased hearing is noted.   Left Ear: Tympanic membrane, external ear and ear canal normal. No lacerations. No drainage, swelling or tenderness. No foreign bodies. No mastoid tenderness. Tympanic membrane is not injected, not scarred, not perforated, not erythematous, not retracted and not bulging. Tympanic membrane mobility is normal.  No middle ear effusion. No hemotympanum. No decreased hearing is noted.   Nose: Nose normal. No mucosal edema, rhinorrhea, nose lacerations, sinus tenderness or nasal deformity.   Mouth/Throat: Uvula is midline.   Eyes: Conjunctivae, EOM and lids are normal. Pupils are equal, round, and reactive to light.   Neck: Trachea normal and normal range of motion. Neck supple. No spinous process tenderness and no muscular tenderness present. No no neck rigidity. No edema, no erythema and normal range of motion present. No thyroid mass and no thyromegaly present.   Pulmonary/Chest: Effort normal.   Abdominal: Soft.   Musculoskeletal: Normal range of motion.   Lymphadenopathy:        Head (right side): No submental, no submandibular, no tonsillar, no preauricular and no posterior auricular adenopathy present.        Head (left side): No submental, no submandibular, no tonsillar, no preauricular, no posterior auricular and no occipital  adenopathy present.     He has no cervical adenopathy.   Neurological: He is alert and oriented to person, place, and time. No cranial nerve deficit or sensory deficit.   Skin: Skin is warm and dry.   Psychiatric: He has a normal mood and affect. His behavior is normal. Judgment and thought content normal.   Nursing note and vitals reviewed.      Assessment:       1. Ear pressure, right    2. Eustachian tube dysfunction, right        Plan:       Politzerization attempted but unsuccessful.  Prednisone 20 mg po x 5 days.  OTC Flonase BID (spray laterally).  Patient deferred Audiogram.  Ears and Altitude pamphlet provided.  RTC prn or sooner.

## 2017-07-12 ENCOUNTER — OFFICE VISIT (OUTPATIENT)
Dept: OTOLARYNGOLOGY | Facility: CLINIC | Age: 52
End: 2017-07-12
Payer: COMMERCIAL

## 2017-07-12 ENCOUNTER — CLINICAL SUPPORT (OUTPATIENT)
Dept: AUDIOLOGY | Facility: CLINIC | Age: 52
End: 2017-07-12
Payer: COMMERCIAL

## 2017-07-12 VITALS — SYSTOLIC BLOOD PRESSURE: 133 MMHG | HEART RATE: 86 BPM | TEMPERATURE: 98 F | DIASTOLIC BLOOD PRESSURE: 74 MMHG

## 2017-07-12 DIAGNOSIS — H93.11 TINNITUS, RIGHT EAR: Primary | ICD-10-CM

## 2017-07-12 DIAGNOSIS — H90.41 SENSORINEURAL HEARING LOSS (SNHL) OF RIGHT EAR WITH UNRESTRICTED HEARING OF LEFT EAR: ICD-10-CM

## 2017-07-12 DIAGNOSIS — H81.311: Primary | ICD-10-CM

## 2017-07-12 DIAGNOSIS — R42 DIZZINESS: ICD-10-CM

## 2017-07-12 DIAGNOSIS — Z87.09 HISTORY OF ASTHMA: ICD-10-CM

## 2017-07-12 DIAGNOSIS — H91.91 ACUTE HEARING LOSS, RIGHT: ICD-10-CM

## 2017-07-12 PROCEDURE — 99213 OFFICE O/P EST LOW 20 MIN: CPT | Mod: S$GLB,,, | Performed by: OTOLARYNGOLOGY

## 2017-07-12 PROCEDURE — 99999 PR PBB SHADOW E&M-EST. PATIENT-LVL I: CPT | Mod: PBBFAC,,,

## 2017-07-12 PROCEDURE — 99999 PR PBB SHADOW E&M-EST. PATIENT-LVL III: CPT | Mod: PBBFAC,,, | Performed by: OTOLARYNGOLOGY

## 2017-07-12 PROCEDURE — 92557 COMPREHENSIVE HEARING TEST: CPT | Mod: S$GLB,,, | Performed by: AUDIOLOGIST

## 2017-07-12 NOTE — PROGRESS NOTES
CC: Right ear hearing loss, tinnitus, vertigo  HPI:Mr. Shook is a 52 year old CM who states his right ear hearing was normal yesterday. He can hardly hear out of his right ear today however.    He also indicates a right ear buzzing sensation which kept him up last night.    He has completed an oral course of prednisone (prescribed initially at 40 mg for several days and titrated down from there) during his last visit with me 6/6/17 re: treatment for his right ear hearing loss, single episode of vertigo in right ear tinnitus perception.    The patient indicated a single episode of vertigo which occurred 1 week prior to his first visit with me; he was drinking wine( unusual for him) ;  he felt ataxic to and from the bathroom in the middle the night.  He was examined in the ED 5/30/17 for dizziness and nausea symptoms as well as hearing loss for several weeks duration and ringing perception in the right ear several weeks duration.  He was diagnosed with BPPV, unspecified laterality.  He was prescribed Antivert 25 mg and Phenergan 25 mg at that time.  He felt dizzy in the  middle of dinner at a steak house recently.    He was evaluated by our departmental nurse practitioner 5/25/17 for right ear pressure symptoms.  She had previously examined the patient 5/4/17 presents sensation of fullness in both ears.  Cerumen impactions were extracted from each ear canal.    The patient was encouraged to consult with Dr. MARICRUZ Mckeon for the patient's history of snoring, deviated nasal septum, turbinate hypertrophy, and ligated uvula and large tongue exacerbating possible sleep apnea.    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  He has a younger girl friend.  He utilizes Viagra.    He states there is always significant medical problem is asthma treated with an inhaled medication  on a when necessary basis.  He also has a history of perennial ALLERGIC rhinitis.      PE: Blood pressure 133/74 pulse 86 temperature  98.4  Gen.: Alert and oriented gentleman in no acute distress  Both ears were examined under the microscope and the micro-procedure room.  Right eardrum is clear and the right middle ear space is well aerated.  The patient completed an audiometric study today results of which are duplicated below a compared to the previous study of 6/6/17.        DIAGNOSIS:     ICD-10-CM ICD-9-CM    1. Tinnitus, right ear H93.11 388.30 MRI Brain W WO Contrast   2. Dizziness R42 780.4 MRI Brain W WO Contrast   3. Acute hearing loss, right H91.91 389.9 MRI Brain W WO Contrast    fluctuating; no reponnse to oral steroids   4. History of asthma Z87.09 V12.69      PLAN: Audiometry reviewed; significant multi-frequency AD SNHL;    change in AD hearing compared to 6/6/17 audiometry  pt. indicates excellent hearing yesterday)  Low sodium diet encouraged as before; refer to instruction sheet previously provided  Avoid weight lifting/forceful nose blowing  I recommend consultation with Moshe Troncoso re: cochlear status; possible perfusion candidate   Rx for diazepam 2 mg # 25; take 1-2 po q 6 hours prn dizziness, vertigo, anxiety ( in place of meclizine)   MRI brain/IACS ordered  Avoid Viagra for now

## 2017-07-12 NOTE — PATIENT INSTRUCTIONS
Audiometry reviewed; significant multi-frequency AD SNHL;    change in AD hearing compared to 6/6/17 audiometry  pt. indicates excellent hearing yesterday)  Low sodium diet encouraged as before; refer to instruction sheet previously provided  Avoid weight lifting/forceful nose blowing  I recommend consultation with Moshe Troncoso re: cochlear status; possible perfusion candidate   Rx for diazepam 2 mg # 25; take 1-2 po q 6 hours prn dizziness, vertigo, anxiety ( in place of meclizine)   MRI brain/IACS ordered  Avoid Viagra for now

## 2017-07-20 ENCOUNTER — HOSPITAL ENCOUNTER (OUTPATIENT)
Dept: RADIOLOGY | Facility: HOSPITAL | Age: 52
Discharge: HOME | End: 2017-07-20
Attending: OTOLARYNGOLOGY
Payer: COMMERCIAL

## 2017-07-20 DIAGNOSIS — H93.11 TINNITUS, RIGHT EAR: ICD-10-CM

## 2017-07-20 DIAGNOSIS — R42 DIZZINESS: ICD-10-CM

## 2017-07-20 DIAGNOSIS — H91.91 ACUTE HEARING LOSS, RIGHT: ICD-10-CM

## 2017-07-20 PROCEDURE — 25500020 PHARM REV CODE 255: Performed by: OTOLARYNGOLOGY

## 2017-07-20 PROCEDURE — A9585 GADOBUTROL INJECTION: HCPCS | Performed by: OTOLARYNGOLOGY

## 2017-07-20 PROCEDURE — 70553 MRI BRAIN STEM W/O & W/DYE: CPT | Mod: TC

## 2017-07-20 PROCEDURE — 70553 MRI BRAIN STEM W/O & W/DYE: CPT | Mod: 26,,, | Performed by: RADIOLOGY

## 2017-07-20 RX ORDER — GADOBUTROL 604.72 MG/ML
10 INJECTION INTRAVENOUS
Status: COMPLETED | OUTPATIENT
Start: 2017-07-20 | End: 2017-07-20

## 2017-07-20 RX ADMIN — GADOBUTROL 10 ML: 604.72 INJECTION INTRAVENOUS at 01:07

## 2018-05-01 ENCOUNTER — TELEPHONE (OUTPATIENT)
Dept: OTOLARYNGOLOGY | Facility: CLINIC | Age: 53
End: 2018-05-01

## 2018-05-01 NOTE — TELEPHONE ENCOUNTER
----- Message from Rachel Carney sent at 5/1/2018  3:46 PM CDT -----  Contact: Pt  Pt is calling to speak with nurse in regards to being referred to inner ear specialist from last visit in July 2017.    Pt can be reached at [phone].  Thank you

## 2018-05-28 ENCOUNTER — CLINICAL SUPPORT (OUTPATIENT)
Dept: AUDIOLOGY | Facility: CLINIC | Age: 53
End: 2018-05-28
Payer: COMMERCIAL

## 2018-05-28 ENCOUNTER — OFFICE VISIT (OUTPATIENT)
Dept: OTOLARYNGOLOGY | Facility: CLINIC | Age: 53
End: 2018-05-28
Payer: COMMERCIAL

## 2018-05-28 VITALS — BODY MASS INDEX: 29.62 KG/M2 | HEIGHT: 72 IN | WEIGHT: 218.69 LBS

## 2018-05-28 DIAGNOSIS — H93.11 TINNITUS OF RIGHT EAR: ICD-10-CM

## 2018-05-28 DIAGNOSIS — H90.41 SENSORINEURAL HEARING LOSS (SNHL) OF RIGHT EAR WITH UNRESTRICTED HEARING OF LEFT EAR: Primary | ICD-10-CM

## 2018-05-28 DIAGNOSIS — H81.01 ENDOLYMPHATIC HYDROPS OF RIGHT EAR: Primary | ICD-10-CM

## 2018-05-28 PROCEDURE — 92567 TYMPANOMETRY: CPT | Mod: S$GLB,,, | Performed by: PHYSICIAN ASSISTANT

## 2018-05-28 PROCEDURE — 3008F BODY MASS INDEX DOCD: CPT | Mod: CPTII,S$GLB,, | Performed by: OTOLARYNGOLOGY

## 2018-05-28 PROCEDURE — 92557 COMPREHENSIVE HEARING TEST: CPT | Mod: S$GLB,,, | Performed by: PHYSICIAN ASSISTANT

## 2018-05-28 PROCEDURE — 99999 PR PBB SHADOW E&M-EST. PATIENT-LVL III: CPT | Mod: PBBFAC,,, | Performed by: OTOLARYNGOLOGY

## 2018-05-28 PROCEDURE — 99999 PR PBB SHADOW E&M-EST. PATIENT-LVL I: CPT | Mod: PBBFAC,,,

## 2018-05-28 PROCEDURE — 99214 OFFICE O/P EST MOD 30 MIN: CPT | Mod: S$GLB,,, | Performed by: OTOLARYNGOLOGY

## 2018-05-28 RX ORDER — TRIAMTERENE AND HYDROCHLOROTHIAZIDE 37.5; 25 MG/1; MG/1
1 CAPSULE ORAL EVERY MORNING
Qty: 30 CAPSULE | Refills: 5 | Status: SHIPPED | OUTPATIENT
Start: 2018-05-28 | End: 2021-03-08

## 2018-05-28 RX ORDER — MOMETASONE FUROATE 50 UG/1
2 SPRAY, METERED NASAL DAILY
COMMUNITY
End: 2018-08-30 | Stop reason: SDUPTHER

## 2018-05-28 NOTE — PROGRESS NOTES
Audiological Evaluation:    Test results indicated normal hearing AS with excellent speech discrimination ability and a moderate SNHL AD with fair speech discrimination ability.  Hearing in the right ear has not significantly changed since the last audiogram on 7/2017.  Normal tympanograms were obtained bilaterally.  Mr. Shook complains of tinnitus, fullness and dizziness.  Recommend:  1.  Otologic evaluation  2.  Follow-up hearing evaluations as needed  3.  Noise protection  4.  Consult with audiologist to discuss amplification options to improve communication following medical clearance

## 2018-05-28 NOTE — PROGRESS NOTES
Otology/Neurotology Consultation Report       Chief Complaint: Vertigo    History of Present Illness: 53 y.o.  male presents as a consultation from Dr. Worthington.  He complains of hearing loss, vertigo, and ear fullness that is episodic in nature.  He states he began having ear fullness bilaterally two years ago that was relieved by cerumen cleaning.  He continued to have ear fullness on the right that was intermittent and relieved by autoinsufflation.  Then, one year ago, he began having hearing loss on the right that would be severe, but again was intermittent with reportedly normal hearing between episodes. He states that he had two episodes of vertigo lasting approximately one hour last week that caused him to leave work early.  These resolved completely, but a feeling of ear fullness remained.  He currently feels fullness in the right ear and notes his hearing is greatly decreased in the right ear for the last couple of days.        ROS       Past Medical History: Patient has a past medical history of Allergy; Asthma; and Trauma to right eye (2007).    Past Surgical History: Patient has a past surgical history that includes Knee surgery (Right).    Social History: Patient reports that he has never smoked. He has never used smokeless tobacco. He reports that he does not drink alcohol.    Family History: family history includes Asthma in his mother; No Known Problems in his brother, father, maternal aunt, maternal grandfather, maternal grandmother, maternal uncle, paternal aunt, paternal grandfather, paternal grandmother, paternal uncle, and sister.    Medications:   Current Outpatient Prescriptions on File Prior to Visit   Medication Sig Dispense Refill    albuterol 90 mcg/actuation inhaler Inhale 2 puffs into the lungs every 6 (six) hours as needed. 18 g 1    albuterol sulfate 2.5 mg/0.5 mL Nebu Take 2.5 mg by nebulization every 6 (six) hours.      amoxicillin-clavulanate 875-125mg (AUGMENTIN) 875-125 mg per  tablet Take 1 tablet by mouth 2 (two) times daily.  0    chlorproMAZINE (THORAZINE) 25 MG tablet Take 1-2 tablets (25-50 mg total) by mouth 3 (three) times daily. Stop taking when hiccups stop occuring. 60 tablet 0    clindamycin (CLEOCIN) 300 MG capsule 300 mg 3 (three) times daily.  0    esomeprazole (NEXIUM) 40 MG capsule Take 1 capsule (40 mg total) by mouth before breakfast. For Acid Reflux. 30 capsule 1    fluticasone (FLONASE) 50 mcg/actuation nasal spray 2 sprays by Each Nare route once daily. 16 g 11    ipratropium (ATROVENT) 0.06 % nasal spray 2 sprays by Nasal route as directed.      meclizine (ANTIVERT) 25 mg tablet Take 1 tablet (25 mg total) by mouth 3 (three) times daily as needed. 20 tablet 0    neomycin-polymyxin-dexamethasone (MAXITROL) 3.5mg/mL-10,000 unit/mL-0.1 % DrpS PUT 1 DROP INTO LEFT EYE 4 TIMES A DAY  0    pravastatin (PRAVACHOL) 20 MG tablet       promethazine (PHENERGAN) 25 MG tablet Take 1 tablet (25 mg total) by mouth every 6 (six) hours as needed for Nausea. 20 tablet 0     No current facility-administered medications on file prior to visit.          Allergies: Patient has No Known Allergies.    Physical Exam    Constitutional: Well appearing / communicating.  NAD.  Eyes: EOM I Bilaterally  Head/Face: Normocephalic.  Negative paranasal sinus pressure/tenderness.  Salivary glands WNL.  House Brackmann I Bilaterally.    Right Ear: External Auditory Canal WNL,TM w/o masses/lesions/perforations.  Auricle WNL.  Left Ear: External Auditory Canal WNL,TM w/o masses/lesions/perforations. Auricle WNL.  Nose: No gross nasal septal deviation. Inferior Turbinates 2+ bilaterally. No septal perforation. No masses/lesions. External nasal skin without masses/lesions.  Oral Cavity: Gingiva/lips WNL.  FOM Soft, no masses palpated. Oral Tongue mobile. Hard Palate WNL.   Oropharynx: BOT WNL. No masses/lesions noted. Tonsillar fossa/pharyngeal wall without lesions. Posterior oropharynx WNL.  Soft  palate without masses. Midline uvula.   Neck/Lymphatic: No LAD I-VI bilaterally.  No thyromegaly.  No masses noted on exam.  Neuro/Psychiatric: AOx3.  Normal mood and affect.   Cardiovascular: Normal carotid pulses bilaterally, no increasing jugular venous distention noted at cervical region bilaterally.    Respiratory: Normal respiratory effort, no stridor, no retractions noted.              Imaging Results    None          A: R Hydrops    P: Low Na+ diet.    Faustino was seen today for ear fullness and tinnitus.    Diagnoses and all orders for this visit:    Endolymphatic hydrops of right ear    Other orders  -     triamterene-hydrochlorothiazide 37.5-25 mg (DYAZIDE) 37.5-25 mg per capsule; Take 1 capsule by mouth every morning.        RTC 1 mo with audio/ VNG.

## 2018-05-30 ENCOUNTER — HOSPITAL ENCOUNTER (EMERGENCY)
Facility: HOSPITAL | Age: 53
Discharge: HOME | End: 2018-05-30
Attending: EMERGENCY MEDICINE
Payer: COMMERCIAL

## 2018-05-30 VITALS
TEMPERATURE: 98 F | HEART RATE: 77 BPM | DIASTOLIC BLOOD PRESSURE: 77 MMHG | RESPIRATION RATE: 17 BRPM | HEIGHT: 72 IN | SYSTOLIC BLOOD PRESSURE: 126 MMHG | OXYGEN SATURATION: 98 % | WEIGHT: 215 LBS | BODY MASS INDEX: 29.12 KG/M2

## 2018-05-30 DIAGNOSIS — R42 DIZZINESS: Primary | ICD-10-CM

## 2018-05-30 LAB
BUN SERPL-MCNC: 15 MG/DL (ref 6–30)
CHLORIDE SERPL-SCNC: 101 MMOL/L (ref 95–110)
CREAT SERPL-MCNC: 1 MG/DL (ref 0.5–1.4)
GLUCOSE SERPL-MCNC: 100 MG/DL (ref 70–110)
HCT VFR BLD CALC: 53 %PCV (ref 36–54)
POC IONIZED CALCIUM: 1.15 MMOL/L (ref 1.06–1.42)
POC TCO2 (MEASURED): 29 MMOL/L (ref 23–29)
POTASSIUM BLD-SCNC: 3.7 MMOL/L (ref 3.5–5.1)
SAMPLE: NORMAL
SODIUM BLD-SCNC: 140 MMOL/L (ref 136–145)

## 2018-05-30 PROCEDURE — 25000003 PHARM REV CODE 250: Performed by: PHYSICIAN ASSISTANT

## 2018-05-30 PROCEDURE — 99283 EMERGENCY DEPT VISIT LOW MDM: CPT | Mod: 25

## 2018-05-30 PROCEDURE — 96360 HYDRATION IV INFUSION INIT: CPT

## 2018-05-30 PROCEDURE — 99284 EMERGENCY DEPT VISIT MOD MDM: CPT | Mod: ,,, | Performed by: PHYSICIAN ASSISTANT

## 2018-05-30 RX ORDER — DIAZEPAM 2 MG/1
2 TABLET ORAL
Status: COMPLETED | OUTPATIENT
Start: 2018-05-30 | End: 2018-05-30

## 2018-05-30 RX ORDER — DIAZEPAM 2 MG/1
2 TABLET ORAL EVERY 6 HOURS PRN
Qty: 10 TABLET | Refills: 0 | Status: SHIPPED | OUTPATIENT
Start: 2018-05-30 | End: 2018-08-30

## 2018-05-30 RX ORDER — PROMETHAZINE HYDROCHLORIDE 25 MG/1
25 TABLET ORAL
Status: COMPLETED | OUTPATIENT
Start: 2018-05-30 | End: 2018-05-30

## 2018-05-30 RX ADMIN — SODIUM CHLORIDE, SODIUM LACTATE, POTASSIUM CHLORIDE, AND CALCIUM CHLORIDE 1000 ML: .6; .31; .03; .02 INJECTION, SOLUTION INTRAVENOUS at 04:05

## 2018-05-30 RX ADMIN — PROMETHAZINE HYDROCHLORIDE 25 MG: 25 TABLET ORAL at 05:05

## 2018-05-30 RX ADMIN — DIAZEPAM 2 MG: 2 TABLET ORAL at 05:05

## 2018-05-30 NOTE — DISCHARGE INSTRUCTIONS
Follow up with your primary care doctor and ENT  Use Valium as needed for dizziness  Return to the ER for any new symptoms        Our goal in the emergency department is to always give you outstanding care and exceptional service. You may receive a survey by mail or e-mail in the next week regarding your experience in our ED. We would greatly appreciate your completing and returning the survey. Your feedback provides us with a way to recognize our staff who give very good care and it helps us learn how to improve when your experience was below our aspiration of excellence.

## 2018-05-30 NOTE — ED PROVIDER NOTES
"Encounter Date: 5/30/2018       History     Chief Complaint   Patient presents with    Dizziness     hx of vertigo, severe discomfort at this time. ringing and severe pressure in right ear. states, "I'm feeling dizzy 24/7." took meclizine 6 hours ago with no relief. similar s/s worse tonight     53-year-old male presents to the ER with his significant other for evaluation of dizziness.  I have seen this patient in the ER before for the same complaint.  I saw him approximately one year ago.  Since that time he has followed up with ENT.  He has had an MRI performed of the brain without any acute abnormalities identified.  The problem tonight has been intermittent since last year.  When I saw him last prescribed meclizine and Phenergan and he had minimal improvement.  Afterwards he was seen by ENT and given Valium 2 mg to take when necessary which helped.  He went a couple months without any problems and then the vertigo started again over the past couple of weeks.  He describes the sensation as a fullness within the right ear and constant ringing with intermittent dizziness.  Sometimes the dizziness is so severe that he cannot do anything.  He was seen by ENT on Monday, May 28 and prescribed medication and advised to return to clinic in one month.  He presents to the ER tonight concerned because his dizziness is not getting any better. He endorses nausea.           Review of patient's allergies indicates:  No Known Allergies  Past Medical History:   Diagnosis Date    Allergy     Asthma     Trauma to right eye 2007    poked in the eye while wrestling     Vertigo      Past Surgical History:   Procedure Laterality Date    KNEE SURGERY Right      Family History   Problem Relation Age of Onset    Asthma Mother     No Known Problems Father     No Known Problems Sister     No Known Problems Brother     No Known Problems Maternal Aunt     No Known Problems Maternal Uncle     No Known Problems Paternal Aunt     " No Known Problems Paternal Uncle     No Known Problems Maternal Grandmother     No Known Problems Maternal Grandfather     No Known Problems Paternal Grandmother     No Known Problems Paternal Grandfather     Amblyopia Neg Hx     Blindness Neg Hx     Cancer Neg Hx     Cataracts Neg Hx     Diabetes Neg Hx     Glaucoma Neg Hx     Hypertension Neg Hx     Macular degeneration Neg Hx     Retinal detachment Neg Hx     Strabismus Neg Hx     Stroke Neg Hx     Thyroid disease Neg Hx     Emphysema Neg Hx      Social History   Substance Use Topics    Smoking status: Never Smoker    Smokeless tobacco: Never Used    Alcohol use No     Review of Systems   Constitutional: Negative for fever.   HENT: Negative for sore throat.    Respiratory: Negative for shortness of breath.    Cardiovascular: Negative for chest pain.   Gastrointestinal: Positive for nausea.   Genitourinary: Negative for dysuria.   Musculoskeletal: Negative for back pain.   Skin: Negative for rash.   Neurological: Positive for dizziness. Negative for weakness.   Hematological: Does not bruise/bleed easily.       Physical Exam     Initial Vitals [05/30/18 0343]   BP Pulse Resp Temp SpO2   139/78 75 18 97.7 °F (36.5 °C) 97 %      MAP       98.33         Physical Exam    Constitutional: Vital signs are normal. He appears well-developed and well-nourished. He is not diaphoretic. No distress.   HENT:   Head: Normocephalic and atraumatic.   Right Ear: External ear normal.   Left Ear: External ear normal.   Normal exam   Eyes: Conjunctivae are normal.   Cardiovascular: Normal rate, regular rhythm and normal heart sounds. Exam reveals no gallop and no friction rub.    No murmur heard.  Pulmonary/Chest: No respiratory distress. He has no wheezes. He has no rhonchi. He has no rales. He exhibits no tenderness.   Abdominal: Soft. Normal appearance and bowel sounds are normal.   Musculoskeletal: Normal range of motion.   Neurological: He is alert and  oriented to person, place, and time.   Bilateral horizontal nystagmus on exam  Patient is very unsteady when trying to stand. Unable to ambulate unassisted.   Any movement causes dizziness to worsen.    Skin: Skin is warm and intact.   Psychiatric: He has a normal mood and affect. His speech is normal and behavior is normal. Cognition and memory are normal.         ED Course   Procedures  Labs Reviewed   ISTAT CHEM8             Medical Decision Making:   ED Management:  53-year-old male with dizziness.   History of this problem, recurrent.  Has already had an MRI which was negative.   I suspect inner ear etiology.   Symptoms improved after a liter of LR and Valium.  Patient is now ambulatory without assistance  Patient is resting comfortably.   I advised the patient and the wife that he should continue taking the medication prescribed by his ENT a few days ago and use the Valium as needed for severe dizziness and the place of meclizine, this is what was recommended by Dr. Snyder last year.   Patient advised to return to the ED as needed and to follow up with his PCP and ENT                      Clinical Impression:   The encounter diagnosis was Dizziness.    Disposition:   Disposition: Discharged  Condition: Stable                        Eligio Castillo PA-C  05/30/18 0549

## 2018-08-03 ENCOUNTER — TELEPHONE (OUTPATIENT)
Dept: ENDOCRINOLOGY | Facility: CLINIC | Age: 53
End: 2018-08-03

## 2018-08-21 ENCOUNTER — LAB VISIT (OUTPATIENT)
Dept: LAB | Facility: HOSPITAL | Age: 53
End: 2018-08-21
Attending: INTERNAL MEDICINE
Payer: COMMERCIAL

## 2018-08-21 ENCOUNTER — OFFICE VISIT (OUTPATIENT)
Dept: INFECTIOUS DISEASES | Facility: CLINIC | Age: 53
End: 2018-08-21
Payer: COMMERCIAL

## 2018-08-21 VITALS
TEMPERATURE: 99 F | HEART RATE: 80 BPM | SYSTOLIC BLOOD PRESSURE: 125 MMHG | HEIGHT: 72 IN | BODY MASS INDEX: 25.71 KG/M2 | WEIGHT: 189.81 LBS | DIASTOLIC BLOOD PRESSURE: 80 MMHG

## 2018-08-21 DIAGNOSIS — R42 VERTIGO: ICD-10-CM

## 2018-08-21 DIAGNOSIS — R42 VERTIGO: Primary | ICD-10-CM

## 2018-08-21 LAB
ALBUMIN SERPL BCP-MCNC: 4.1 G/DL
ALP SERPL-CCNC: 71 U/L
ALT SERPL W/O P-5'-P-CCNC: 116 U/L
ANION GAP SERPL CALC-SCNC: 9 MMOL/L
AST SERPL-CCNC: 25 U/L
BASOPHILS # BLD AUTO: 0.02 K/UL
BASOPHILS NFR BLD: 0.4 %
BILIRUB SERPL-MCNC: 0.7 MG/DL
BUN SERPL-MCNC: 18 MG/DL
CALCIUM SERPL-MCNC: 9.9 MG/DL
CHLORIDE SERPL-SCNC: 99 MMOL/L
CO2 SERPL-SCNC: 32 MMOL/L
CREAT SERPL-MCNC: 1.3 MG/DL
CRP SERPL-MCNC: 1.5 MG/L
DIFFERENTIAL METHOD: NORMAL
EOSINOPHIL # BLD AUTO: 0.1 K/UL
EOSINOPHIL NFR BLD: 2 %
ERYTHROCYTE [DISTWIDTH] IN BLOOD BY AUTOMATED COUNT: 12.9 %
ERYTHROCYTE [SEDIMENTATION RATE] IN BLOOD BY WESTERGREN METHOD: 3 MM/HR
EST. GFR  (AFRICAN AMERICAN): >60 ML/MIN/1.73 M^2
EST. GFR  (NON AFRICAN AMERICAN): >60 ML/MIN/1.73 M^2
GLUCOSE SERPL-MCNC: 66 MG/DL
HCT VFR BLD AUTO: 50.4 %
HGB BLD-MCNC: 17.5 G/DL
IMM GRANULOCYTES # BLD AUTO: 0.01 K/UL
IMM GRANULOCYTES NFR BLD AUTO: 0.2 %
LYMPHOCYTES # BLD AUTO: 1.3 K/UL
LYMPHOCYTES NFR BLD: 26.6 %
MCH RBC QN AUTO: 30.1 PG
MCHC RBC AUTO-ENTMCNC: 34.7 G/DL
MCV RBC AUTO: 87 FL
MONOCYTES # BLD AUTO: 0.5 K/UL
MONOCYTES NFR BLD: 9.7 %
NEUTROPHILS # BLD AUTO: 3 K/UL
NEUTROPHILS NFR BLD: 61.1 %
NRBC BLD-RTO: 0 /100 WBC
PLATELET # BLD AUTO: 231 K/UL
PMV BLD AUTO: 9.8 FL
POTASSIUM SERPL-SCNC: 3.6 MMOL/L
PROT SERPL-MCNC: 7.5 G/DL
RBC # BLD AUTO: 5.81 M/UL
SODIUM SERPL-SCNC: 140 MMOL/L
TSH SERPL DL<=0.005 MIU/L-ACNC: 2.24 UIU/ML
WBC # BLD AUTO: 4.97 K/UL

## 2018-08-21 PROCEDURE — 3008F BODY MASS INDEX DOCD: CPT | Mod: CPTII,S$GLB,, | Performed by: INTERNAL MEDICINE

## 2018-08-21 PROCEDURE — 86140 C-REACTIVE PROTEIN: CPT

## 2018-08-21 PROCEDURE — 85652 RBC SED RATE AUTOMATED: CPT

## 2018-08-21 PROCEDURE — 36415 COLL VENOUS BLD VENIPUNCTURE: CPT

## 2018-08-21 PROCEDURE — 99999 PR PBB SHADOW E&M-EST. PATIENT-LVL III: CPT | Mod: PBBFAC,,, | Performed by: INTERNAL MEDICINE

## 2018-08-21 PROCEDURE — 85025 COMPLETE CBC W/AUTO DIFF WBC: CPT

## 2018-08-21 PROCEDURE — 84443 ASSAY THYROID STIM HORMONE: CPT

## 2018-08-21 PROCEDURE — 99205 OFFICE O/P NEW HI 60 MIN: CPT | Mod: S$GLB,,, | Performed by: INTERNAL MEDICINE

## 2018-08-21 PROCEDURE — 80053 COMPREHEN METABOLIC PANEL: CPT

## 2018-08-21 RX ORDER — VALACYCLOVIR HYDROCHLORIDE 1 G/1
TABLET, FILM COATED ORAL
COMMUNITY
Start: 2018-08-04

## 2018-08-21 NOTE — PROGRESS NOTES
Subjective:      Patient ID: Faustino Shook is a 53 y.o. male.    Chief Complaint:Other (Post travel from [place])      History of Present Illness    Pt and his wife were surfing and traveling in [place] and [place] in April. He had no symptomatic illnesses while there. After return he developed sensation of right ear being clogged up and then had acute right hearing loss, followed by multiple episodes of vertigo.   He has seen Dr. Milind Hernandez, Lonnie Messina, Flor and upcoming appt with Dr. Bearden, all otologists. The three he has seen have diagnosed him as having Meniere's disease. Dr. Messina told him he needs endolymphatic surgery and he is seeking another opinion. He is here in ID because he was wondering if he has some infection accounting for this. He read a report of association with herpes viruses and has procured a rx for valtrex which he is presently on. He is also on dyazide without apparent benefit. He had two courses of steroids without benefit and doesn't tolerate meclizine well. Has prn valium for use. His last episode of vertigo was last Saturday and lasted about two hours.        Review of Systems   Constitution: Negative for chills, decreased appetite, fever, weakness, malaise/fatigue, night sweats, weight gain and weight loss.   HENT: Positive for hearing loss and tinnitus. Negative for congestion, ear pain, hoarse voice and sore throat.    Eyes: Positive for blurred vision. Negative for redness and visual disturbance.   Cardiovascular: Negative for chest pain, leg swelling and palpitations.   Respiratory: Negative for cough, hemoptysis, shortness of breath and sputum production.    Hematologic/Lymphatic: Negative for adenopathy. Does not bruise/bleed easily.   Skin: Negative for dry skin, itching, rash and suspicious lesions.   Musculoskeletal: Negative for back pain, joint pain, myalgias and neck pain.   Gastrointestinal: Negative for abdominal pain, constipation, diarrhea,  heartburn, nausea and vomiting.   Genitourinary: Negative for dysuria, flank pain, frequency, hematuria, hesitancy and urgency.   Neurological: Positive for dizziness. Negative for headaches, numbness and paresthesias.   Psychiatric/Behavioral: Negative for depression and memory loss. The patient does not have insomnia and is not nervous/anxious.      Objective:   Physical Exam   Constitutional: He is oriented to person, place, and time. He appears well-developed and well-nourished.   HENT:   Head: Normocephalic and atraumatic.   Right Ear: External ear normal.   Left Ear: External ear normal.   Mouth/Throat: Oropharynx is clear and moist.   Eyes: Conjunctivae and EOM are normal. Pupils are equal, round, and reactive to light.   Neck: Normal range of motion. Neck supple. No thyromegaly present.   Cardiovascular: Normal rate, regular rhythm and normal heart sounds.   No murmur heard.  Pulmonary/Chest: Effort normal and breath sounds normal. He has no wheezes. He has no rales.   Abdominal: Soft. Bowel sounds are normal. He exhibits no mass. There is no tenderness. There is no rebound.   Musculoskeletal: Normal range of motion.   Lymphadenopathy:     He has no cervical adenopathy.   Neurological: He is alert and oriented to person, place, and time.   Skin: Skin is warm and dry.   Psychiatric: He has a normal mood and affect. His behavior is normal.   Vitals reviewed.    Assessment:       1. Vertigo following trip to [facility]; probable Meniere's disease; doubt any infectious cause         Plan:        CBC, CMP, ESR, CRP, TSH with phone review

## 2018-08-30 ENCOUNTER — OFFICE VISIT (OUTPATIENT)
Dept: FAMILY MEDICINE | Facility: CLINIC | Age: 53
End: 2018-08-30
Payer: COMMERCIAL

## 2018-08-30 VITALS
TEMPERATURE: 99 F | WEIGHT: 188.63 LBS | SYSTOLIC BLOOD PRESSURE: 120 MMHG | BODY MASS INDEX: 25.55 KG/M2 | DIASTOLIC BLOOD PRESSURE: 86 MMHG | HEIGHT: 72 IN | OXYGEN SATURATION: 95 % | HEART RATE: 100 BPM

## 2018-08-30 DIAGNOSIS — J45.20 MILD INTERMITTENT CHRONIC ASTHMA WITHOUT COMPLICATION: ICD-10-CM

## 2018-08-30 DIAGNOSIS — H81.01 ENDOLYMPHATIC HYDROPS OF RIGHT EAR: ICD-10-CM

## 2018-08-30 DIAGNOSIS — R79.89 ELEVATED LIVER FUNCTION TESTS: ICD-10-CM

## 2018-08-30 DIAGNOSIS — H81.01 MENIERE DISEASE, RIGHT: ICD-10-CM

## 2018-08-30 DIAGNOSIS — Z01.810 PREOPERATIVE CARDIOVASCULAR EXAMINATION: Primary | ICD-10-CM

## 2018-08-30 DIAGNOSIS — J30.89 PERENNIAL ALLERGIC RHINITIS: ICD-10-CM

## 2018-08-30 DIAGNOSIS — R91.8 PULMONARY NODULES/LESIONS, MULTIPLE: ICD-10-CM

## 2018-08-30 PROCEDURE — 93010 ELECTROCARDIOGRAM REPORT: CPT | Mod: S$GLB,,, | Performed by: INTERNAL MEDICINE

## 2018-08-30 PROCEDURE — 93005 ELECTROCARDIOGRAM TRACING: CPT | Mod: S$GLB,,, | Performed by: FAMILY MEDICINE

## 2018-08-30 PROCEDURE — 99999 PR PBB SHADOW E&M-EST. PATIENT-LVL III: CPT | Mod: PBBFAC,,, | Performed by: FAMILY MEDICINE

## 2018-08-30 PROCEDURE — 99214 OFFICE O/P EST MOD 30 MIN: CPT | Mod: S$GLB,,, | Performed by: FAMILY MEDICINE

## 2018-08-30 PROCEDURE — 3008F BODY MASS INDEX DOCD: CPT | Mod: CPTII,S$GLB,, | Performed by: FAMILY MEDICINE

## 2018-08-30 RX ORDER — MOMETASONE FUROATE 50 UG/1
2 SPRAY, METERED NASAL DAILY
Qty: 17 G | Refills: 1 | Status: SHIPPED | OUTPATIENT
Start: 2018-08-30

## 2018-08-30 NOTE — PROGRESS NOTES
FAMILY MEDICINE    Patient Active Problem List   Diagnosis    Perennial allergic rhinitis    Erectile dysfunction    Pulmonary nodules/lesions, multiple    Chronic asthma    Elevated liver function tests    Meniere disease, right    Preoperative cardiovascular examination    Endolymphatic hydrops of right ear       CC:   Chief Complaint   Patient presents with    Pre-op Exam       SUBJECTIVE:  Faustino Shook   is a 53 y.o. male  - with mild intermittent asthma here for preoperative evaluation for Endolymphatic sac surgery by by Dr. Gregory Messina [phone]  [address] [place] [address] scheduled for 9/20/18. His primary care is Dr. Rea but pt reports that he was unable to get a visit with his PCP> Pt reports that has been suffering from dizziness, tinnitus and right hearing loss that has progressed over the last 1 year. He has seen several ENT's including Dr. Doty, Dr. Worthington, and Dr. Mesa since 5/2017 and has had 2 ER visits for this issue. Audiogram has shown mild to moderate SNHL right ear. He was also evaluated by Dr. Bustos, Infectious Disease for possible infectious etiology. 7/20/17 MRI brain was unremarkable.  5/2018 he was re-evaluated by Dr. Mesa and diagnosed with Meniere's disease 2/2 right endolymphatic hydrops. Pt reports that he saw Dr. Messina for secondary evaluation and treatment recs and reports that endolymphatic sac surgery was recommended  Besides his intermittent dizziness, tinnitus and hearing loss pt reports that he is feeling well. His asthma is well controlled and reports that he only has issues in the winter. No recent exacerbations or urgent care/hospital visit for asthma.           ROS: Review of Systems   Constitutional: Negative for activity change, appetite change, fatigue and fever.   HENT: Positive for tinnitus. Negative for congestion, mouth sores, nosebleeds, sinus pressure and sinus pain.    Eyes:  Negative for photophobia and visual disturbance.   Respiratory: Negative for cough, chest tightness, shortness of breath and wheezing.    Cardiovascular: Negative for chest pain, palpitations and leg swelling.   Gastrointestinal: Negative for abdominal pain, constipation, diarrhea, nausea and vomiting.   Endocrine: Negative for cold intolerance, heat intolerance, polydipsia, polyphagia and polyuria.   Genitourinary: Negative for difficulty urinating, frequency and urgency.   Musculoskeletal: Negative for myalgias, neck pain and neck stiffness.   Skin: Negative for rash and wound.   Allergic/Immunologic: Negative for immunocompromised state.   Neurological: Positive for dizziness and light-headedness. Negative for tremors, seizures, syncope, speech difficulty, weakness, numbness and headaches.   Hematological: Negative for adenopathy. Does not bruise/bleed easily.   Psychiatric/Behavioral: Negative for sleep disturbance. The patient is not nervous/anxious.        Past Medical History:   Diagnosis Date    Allergy     Asthma     Trauma to right eye 2007    poked in the eye while wrestling     Vertigo        Past Surgical History:   Procedure Laterality Date    KNEE SURGERY Right        ALLERGIES: Review of patient's allergies indicates:  No Known Allergies    MEDS:   Current Outpatient Medications:     albuterol 90 mcg/actuation inhaler, Inhale 2 puffs into the lungs every 6 (six) hours as needed., Disp: 18 g, Rfl: 1    meclizine (ANTIVERT) 25 mg tablet, Take 1 tablet (25 mg total) by mouth 3 (three) times daily as needed., Disp: 20 tablet, Rfl: 0    triamterene-hydrochlorothiazide 37.5-25 mg (DYAZIDE) 37.5-25 mg per capsule, Take 1 capsule by mouth every morning., Disp: 30 capsule, Rfl: 5    valACYclovir (VALTREX) 1000 MG tablet, , Disp: , Rfl:     mometasone (NASONEX) 50 mcg/actuation nasal spray, 2 sprays by Nasal route once daily., Disp: 17 g, Rfl: 1    OBJECTIVE:   Vitals:    08/30/18 0851   BP: 120/86    BP Location: Left arm   Patient Position: Sitting   BP Method: Large (Manual)   Pulse: 100   Temp: 98.6 °F (37 °C)   TempSrc: Oral   SpO2: 95%   Weight: 85.5 kg (188 lb 9.6 oz)   Height: 6' (1.829 m)       Physical Exam   Constitutional: He is oriented to person, place, and time. He appears well-nourished. No distress.   HENT:   Head: Normocephalic and atraumatic.   Eyes: EOM are normal. Pupils are equal, round, and reactive to light. No scleral icterus.   Neck: Neck supple. No thyromegaly present.   Cardiovascular: Normal rate, regular rhythm, normal heart sounds and intact distal pulses.   No murmur heard.  Pulmonary/Chest: Effort normal and breath sounds normal. He has no wheezes. He has no rales.   Abdominal: Soft. Bowel sounds are normal. He exhibits no distension. There is no tenderness.   Musculoskeletal: Normal range of motion. He exhibits no edema.   Lymphadenopathy:     He has no cervical adenopathy.   Neurological: He is alert and oriented to person, place, and time. He displays normal reflexes. No cranial nerve deficit.   Skin: Skin is warm. Capillary refill takes less than 2 seconds.         PERTINENT RESULTS:     8/30/18 EKG:   NSR HR 68 bpm with QTc 431ms. Incompleted RBBB that was noted in 5/2017 and 7/6/2016 EKG and unchanged    8/30/2018   X-Ray Chest PA And Lateral   EXAMINATION:  XR CHEST PA AND LATERAL    CLINICAL HISTORY:  Mild intermittent asthma, uncomplicated    TECHNIQUE:  PA and lateral views of the chest were performed.    COMPARISON:  None    FINDINGS:  Lungs clear.  Heart normal.      Impression       Normal chest.       8/30/2016 Spirometry:   Normal    Lab Visit on 08/21/2018   Component Date Value Ref Range Status    WBC 08/21/2018 4.97  3.90 - 12.70 K/uL Final    RBC 08/21/2018 5.81  4.60 - 6.20 M/uL Final    Hemoglobin 08/21/2018 17.5  14.0 - 18.0 g/dL Final    Hematocrit 08/21/2018 50.4  40.0 - 54.0 % Final    MCV 08/21/2018 87  82 - 98 fL Final    MCH 08/21/2018 30.1   27.0 - 31.0 pg Final    MCHC 08/21/2018 34.7  32.0 - 36.0 g/dL Final    RDW 08/21/2018 12.9  11.5 - 14.5 % Final    Platelets 08/21/2018 231  150 - 350 K/uL Final    MPV 08/21/2018 9.8  9.2 - 12.9 fL Final    Immature Granulocytes 08/21/2018 0.2  0.0 - 0.5 % Final    Gran # (ANC) 08/21/2018 3.0  1.8 - 7.7 K/uL Final    Immature Grans (Abs) 08/21/2018 0.01  0.00 - 0.04 K/uL Final    Comment: Mild elevation in immature granulocytes is non specific and   can be seen in a variety of conditions including stress response,   acute inflammation, trauma and pregnancy. Correlation with other   laboratory and clinical findings is essential.      Lymph # 08/21/2018 1.3  1.0 - 4.8 K/uL Final    Mono # 08/21/2018 0.5  0.3 - 1.0 K/uL Final    Eos # 08/21/2018 0.1  0.0 - 0.5 K/uL Final    Baso # 08/21/2018 0.02  0.00 - 0.20 K/uL Final    nRBC 08/21/2018 0  0 /100 WBC Final    Gran% 08/21/2018 61.1  38.0 - 73.0 % Final    Lymph% 08/21/2018 26.6  18.0 - 48.0 % Final    Mono% 08/21/2018 9.7  4.0 - 15.0 % Final    Eosinophil% 08/21/2018 2.0  0.0 - 8.0 % Final    Basophil% 08/21/2018 0.4  0.0 - 1.9 % Final    Differential Method 08/21/2018 Automated   Final    Sodium 08/21/2018 140  136 - 145 mmol/L Final    Potassium 08/21/2018 3.6  3.5 - 5.1 mmol/L Final    Chloride 08/21/2018 99  95 - 110 mmol/L Final    CO2 08/21/2018 32* 23 - 29 mmol/L Final    Glucose 08/21/2018 66* 70 - 110 mg/dL Final    BUN, Bld 08/21/2018 18  6 - 20 mg/dL Final    Creatinine 08/21/2018 1.3  0.5 - 1.4 mg/dL Final    Calcium 08/21/2018 9.9  8.7 - 10.5 mg/dL Final    Total Protein 08/21/2018 7.5  6.0 - 8.4 g/dL Final    Albumin 08/21/2018 4.1  3.5 - 5.2 g/dL Final    Total Bilirubin 08/21/2018 0.7  0.1 - 1.0 mg/dL Final    Comment: For infants and newborns, interpretation of results should be based  on gestational age, weight and in agreement with clinical  observations.  Premature Infant recommended reference ranges:  Up to 24  hours.............<8.0 mg/dL  Up to 48 hours............<12.0 mg/dL  3-5 days..................<15.0 mg/dL  6-29 days.................<15.0 mg/dL      Alkaline Phosphatase 08/21/2018 71  55 - 135 U/L Final    AST 08/21/2018 25  10 - 40 U/L Final    ALT 08/21/2018 116* 10 - 44 U/L Final    Anion Gap 08/21/2018 9  8 - 16 mmol/L Final    eGFR if African American 08/21/2018 >60.0  >60 mL/min/1.73 m^2 Final    eGFR if non African American 08/21/2018 >60.0  >60 mL/min/1.73 m^2 Final    Comment: Calculation used to obtain the estimated glomerular filtration  rate (eGFR) is the CKD-EPI equation.       Sed Rate 08/21/2018 3  0 - 23 mm/Hr Final    CRP 08/21/2018 1.5  0.0 - 8.2 mg/L Final    TSH 08/21/2018 2.236  0.400 - 4.000 uIU/mL Final     ASSESSMENT:  Problem List Items Addressed This Visit     Perennial allergic rhinitis    Relevant Medications    mometasone (NASONEX) 50 mcg/actuation nasal spray    Pulmonary nodules/lesions, multiple    Overview     - 2/23/17 CT chest: 1. Stable size and appearance of multiple solid and sub-solid pulmonary nodules dating back to examination from 3/14/2014.  2. Biapical patchy soft tissue opacifications stable in appearance examination dating back to 2014.  - evaluated by Dr. Pickens and no further monitoring recommended         Current Assessment & Plan     - no acute issues  - stable since 2014  - no further monitoring warranted per Pulmonary         Chronic asthma    Current Assessment & Plan     - mild intermittent  - 2016 PFT reviewed and normal  - CXR normal         Relevant Orders    IN OFFICE EKG 12-LEAD (to Muse) (Completed)    X-Ray Chest PA And Lateral (Completed)    Elevated liver function tests    Overview     -8/2/18  and followed by Dr. Cali Bustos who reported that he will repeat in 1 month           Current Assessment & Plan     - consider repeat LFTs prior to surgery         Meniere disease, right    Current Assessment & Plan     - symptoms poorly  contrlled  - ENT following         Preoperative cardiovascular examination - Primary    Current Assessment & Plan     - labs, EKG and CXR reviewed  - pt low risk for low-moderate risk procedure  - okay to proceed         Relevant Orders    IN OFFICE EKG 12-LEAD (to Muse) (Completed)    Endolymphatic hydrops of right ear    Current Assessment & Plan     - plan for endolymphatic surgery 9/20/18 by Dr. Messina             - CXR and EKG normal  - labs reviewed  - Terrell score 0.4% very low risk for cardiac complications  - Reed Perioperative Cardiac Risk Score = 0.02%  - Postoperative Resp Failure risk Calculator 0.6%    PLAN:   Orders Placed This Encounter    X-Ray Chest PA And Lateral    IN OFFICE EKG 12-LEAD (to Muse)    mometasone (NASONEX) 50 mcg/actuation nasal spray     Okay to proceed with surgery.     Follow-up with PCP Dr. Rea in 1-2 week post-op.   Copy of note, EKG, CXR and labs to be faxed to Dr. Mayuri Mon D.O.   [facility]

## 2018-08-30 NOTE — Clinical Note
Fax copy of note, EKG, CXR and last labs to Dr. Mayuri Khanna (phone number in note). Once faxed please notify patient the preop eval faxed to Dr. Messina

## 2020-04-18 ENCOUNTER — OFFICE VISIT (OUTPATIENT)
Dept: URGENT CARE | Facility: CLINIC | Age: 55
End: 2020-04-18
Payer: COMMERCIAL

## 2020-04-18 VITALS
BODY MASS INDEX: 25.53 KG/M2 | OXYGEN SATURATION: 97 % | WEIGHT: 188.5 LBS | TEMPERATURE: 97 F | HEIGHT: 72 IN | RESPIRATION RATE: 20 BRPM | HEART RATE: 72 BPM

## 2020-04-18 DIAGNOSIS — B34.9 ACUTE VIRAL SYNDROME: Primary | ICD-10-CM

## 2020-04-18 PROCEDURE — 71046 X-RAY EXAM CHEST 2 VIEWS: CPT | Mod: S$GLB,,, | Performed by: RADIOLOGY

## 2020-04-18 PROCEDURE — 71046 XR CHEST PA AND LATERAL: ICD-10-PCS | Mod: S$GLB,,, | Performed by: RADIOLOGY

## 2020-04-18 PROCEDURE — 99214 PR OFFICE/OUTPT VISIT, EST, LEVL IV, 30-39 MIN: ICD-10-PCS | Mod: S$GLB,,, | Performed by: EMERGENCY MEDICINE

## 2020-04-18 PROCEDURE — 99214 OFFICE O/P EST MOD 30 MIN: CPT | Mod: S$GLB,,, | Performed by: EMERGENCY MEDICINE

## 2020-04-18 PROCEDURE — U0002 COVID-19 LAB TEST NON-CDC: HCPCS

## 2020-04-18 NOTE — PROGRESS NOTES
[facility] - Visit Note                                           Chief Complaint  54 y.o. male with Cough ([phone])    History of Present Illness  Faustino Shook presents to the urgent care with complaints of cough, chest discomfort and shortness of breath.  Patient reports that the symptoms have been there for several days.  It feels similar but different to his normal asthma attacks.  Patient has been using his inhaler and his nebulizer treatment without relief as he would normally expect during asthma.  He would like to be tested for corona virus.  He denies respiratory distress.  This visit was conducted remotely per [facility] protocol.    Past Medical History:   Diagnosis Date    Allergy     Asthma     Trauma to right eye 2007    poked in the eye while wrestling     Vertigo      Past Surgical History:   Procedure Laterality Date    KNEE SURGERY Right       Review of patient's allergies indicates:  No Known Allergies     Review of Systems and Physical Exam     Review of Systems  -- Constitution - no fever, no weight loss, no loss of consciousness  -- Eyes - no changes in vision, no redness, no swelling, no discharge  -- Ear, Nose - no  earache, no loss of hearing, no epistaxis  -- Mouth,Throat - no sore throat, no toothache, normal voice, normal swallowing  -- Respiratory - reports cough and congestion, reports shortness of breath, no wheezing, no increased WOB   -- Cardiovascular - denies chest pain, no palpitations, no lower extremity edema  -- Gastrointestinal - denies abdominal pain, denies nausea, vomiting, and diarrhea  -- Genitourinary - no dysuria, denies flank pain, no hematuria or frequency   -- Musculoskeletal - denies back pain, negative for myalgias and arthralgias   -- Neurological - no headache, no neurologic changes, no loss of bladder or bowel function no seizure like activity, no changes in hearing or vision  -- Skin - denies skin changes, no rash, no hives,  no suspected skin infection    Vital Signs   height is 6' (1.829 m) and weight is 85.5 kg (188 lb 7.9 oz). His temperature is 97.3 °F (36.3 °C). His pulse is 72. His respiration is 20 and oxygen saturation is 97%.      Physical Exam not conducted.  Vital signs stable and within normal limits.  Patient sounds to be in no acute distress over the phone    Treatment Course, Evaluation, and Medical Decision Makin.  Physical exam not completed  2.  X-ray negative for acute process  3.  COVID-19 pending  4. Dc home       Diagnosis  -- The encounter diagnosis was Acute viral syndrome.    Disposition and Plan  -- Disposition: home  -- Condition: stable  -- Follow-up: Patient to follow up with Samy Rea MD in 1-2 days, and any specialists noted on discharge paperwork  -- I advised the patient that we have found no life threatening condition today and have provided recommendations his/her care  -- At this time, I believe the patient is clinically stable for discharge.   -- The patient acknowledges that ongoing follow up with a MD is required   -- Patient agrees to comply with all instruction and direction given in the urgent care  -- Patient counseled on strict return precautions as discussed

## 2020-04-18 NOTE — PATIENT INSTRUCTIONS
At this time it is suspected that you may have coronavirus.  You have been tested for coronavirus and your results are pending.  You will be contacted with results in several days.  At this time you should self-quarantine at home for 7 days and an additional 72 hours after symptoms and fever have resolved.  Please do not have contact with anyone who is immune compromised, pregnant or at the extremes of age.  Your chest x-ray is normal today    Over the Counter Medications for acute viral symptoms:    1. Ibuprofen 800 mg every 8 hours. May alternate with tylenol 1000 mg every 4 hours. (Do not take tylenol with other sources of acetaminophen such as Dayquil)  2. Zyrtec or Claritin 10 mg daily  3. Delsym or Dayquil for cough and congestion  4. Flonase for congestion and sinus pressure      Please call your primary care doctor or telemedicine if your symptoms worsen.  You may need to speak to your physician regarding whether you require further evaluation in the emergency room.

## 2020-04-19 LAB — SARS-COV-2 RNA RESP QL NAA+PROBE: NOT DETECTED

## 2020-04-20 ENCOUNTER — TELEPHONE (OUTPATIENT)
Dept: URGENT CARE | Facility: CLINIC | Age: 55
End: 2020-04-20

## 2020-04-27 ENCOUNTER — PATIENT OUTREACH (OUTPATIENT)
Dept: ADMINISTRATIVE | Facility: HOSPITAL | Age: 55
End: 2020-04-27

## 2022-10-06 ENCOUNTER — PATIENT MESSAGE (OUTPATIENT)
Dept: INTERNAL MEDICINE | Facility: CLINIC | Age: 57
End: 2022-10-06
Payer: COMMERCIAL

## 2022-10-24 ENCOUNTER — PATIENT MESSAGE (OUTPATIENT)
Dept: INTERNAL MEDICINE | Facility: CLINIC | Age: 57
End: 2022-10-24
Payer: COMMERCIAL

## 2023-05-08 ENCOUNTER — TELEPHONE (OUTPATIENT)
Dept: ENDOSCOPY | Facility: HOSPITAL | Age: 58
End: 2023-05-08
Payer: COMMERCIAL

## 2023-05-08 DIAGNOSIS — Z12.11 SCREENING FOR COLON CANCER: Primary | ICD-10-CM

## 2023-05-08 RX ORDER — ATORVASTATIN CALCIUM 20 MG/1
TABLET, FILM COATED ORAL
COMMUNITY
Start: 2023-05-05

## 2023-05-08 NOTE — TELEPHONE ENCOUNTER
Outside referral received from [facility]-Dr. Jean Pierre Costello for a screening colonoscopy.     Requesting to be scheduled with Dr. Mays-Venus. See media tab for records and referral.

## 2023-08-24 ENCOUNTER — TELEPHONE (OUTPATIENT)
Dept: PODIATRY | Facility: CLINIC | Age: 58
End: 2023-08-24
Payer: COMMERCIAL

## 2024-09-03 ENCOUNTER — TELEPHONE (OUTPATIENT)
Dept: ENDOSCOPY | Facility: HOSPITAL | Age: 59
End: 2024-09-03
Payer: COMMERCIAL

## 2024-09-03 NOTE — TELEPHONE ENCOUNTER
----- Message from Josefina Easley sent at 9/3/2024 11:50 AM CDT -----  Regarding: FW: colonoscopy  Please follow-up on this request ASAP.    Thanks!  ----- Message -----  From: Evelin Pyle  Sent: 9/3/2024   9:29 AM CDT  To: [facility] Endoscopy Schedulers  Subject: FW: colonoscopy                                  Hi   Can you plz advise update on appt?     If your dept is running behind plz advise and provide ETA so that I can info my mgmt and to ensure you dont receive repeat messages.     Thanks,  Evelin [facility]      8/20/2024  3:09 PM Done Josefina Easley (Pool)  8/27/2024 11:09 AM Done Josefina Easley (Pool)  ----- Message -----  From: Evelin Pyle  Sent: 8/27/2024   8:50 AM CDT  To: [facility] Endoscopy Schedulers  Subject: FW: colonoscopy                                  Can you plz advise appt status?     8/20/2024  3:09 PM Done Josefina Easley (Pool)    Thank youEvelin  ----- Message -----  From: Evelin Pyle  Sent: 8/20/2024   1:42 PM CDT  To: [facility] Endoscopy Schedulers  Subject: colonoscopy                                      FLOWER [facility]    Ordering Colonoscopy     Plz ctc pt to schedule     Order/records in media     Thx  [facility]

## 2024-09-03 NOTE — TELEPHONE ENCOUNTER
Contacted the patient to schedule an endoscopy procedure(s) 9/3/24. The patient did not answer the call and left a voice message requesting a call back.

## 2024-09-23 NOTE — PROGRESS NOTES
Subjective:       Patient ID: Faustino Shook is a 59 y.o. male.    Chief Complaint: No chief complaint on file.     This is a 59 y.o.  male patient that is new to me.    Vasectomy about 25 years ago to have vasectomy reversal at [facility].    On trimix pge 20 mcg/pap 30/phen 2, using 5 units, from Dr. Frederick Crum.  States about 60% erection after use, lasts about 15 minutes.  Used cialis/viagra/levitra in past, had side effects and wasn't working.              Lab Results   Component Value Date    CREATININE 1.3 08/21/2018       ---  PMH/PSH/Medications/Allergies/Social history reviewed and as in chart.    Review of Systems   Constitutional:  Negative for activity change, chills and fever.   HENT:  Negative for congestion.    Respiratory:  Negative for cough, chest tightness and shortness of breath.    Cardiovascular:  Negative for chest pain and palpitations.   Gastrointestinal:  Negative for abdominal distention, abdominal pain, nausea and vomiting.   Genitourinary:  Negative for difficulty urinating, flank pain, hematuria, penile pain, scrotal swelling and testicular pain.   Musculoskeletal:  Negative for gait problem.       Objective:      Physical Exam  HENT:      Head: Atraumatic.   Pulmonary:      Effort: Pulmonary effort is normal.   Neurological:      General: No focal deficit present.      Mental Status: He is alert and oriented to person, place, and time.         Assessment:     Problem Noted   S/P Vasectomy 9/24/2024   Erectile Dysfunction Due to Arterial Insufficiency 2/27/2014       Plan:     Discussed vasectomy reversal, I do not do this procedure, to have done at [facility]  Discussed increasing dose of Trimix and will require teaching from NP.  Increased dose to pharmacy.  Will schedule teaching.       Obi Madden MD    The above referenced imaging and interpretations were personally reviewed.  Disclaimer: This note has been generated using voice-recognition software. There may be  typographical errors that have been missed during proof-reading.

## 2024-09-24 ENCOUNTER — OFFICE VISIT (OUTPATIENT)
Dept: UROLOGY | Facility: CLINIC | Age: 59
End: 2024-09-24
Payer: COMMERCIAL

## 2024-09-24 VITALS
HEIGHT: 72 IN | HEART RATE: 87 BPM | BODY MASS INDEX: 25.46 KG/M2 | WEIGHT: 187.94 LBS | DIASTOLIC BLOOD PRESSURE: 71 MMHG | SYSTOLIC BLOOD PRESSURE: 120 MMHG

## 2024-09-24 DIAGNOSIS — Z98.52 S/P VASECTOMY: ICD-10-CM

## 2024-09-24 DIAGNOSIS — N52.01 ERECTILE DYSFUNCTION DUE TO ARTERIAL INSUFFICIENCY: Primary | ICD-10-CM

## 2024-09-24 PROCEDURE — 3008F BODY MASS INDEX DOCD: CPT | Mod: CPTII,S$GLB,, | Performed by: UROLOGY

## 2024-09-24 PROCEDURE — 99204 OFFICE O/P NEW MOD 45 MIN: CPT | Mod: S$GLB,,, | Performed by: UROLOGY

## 2024-09-24 PROCEDURE — 3078F DIAST BP <80 MM HG: CPT | Mod: CPTII,S$GLB,, | Performed by: UROLOGY

## 2024-09-24 PROCEDURE — 99999 PR PBB SHADOW E&M-EST. PATIENT-LVL III: CPT | Mod: PBBFAC,,, | Performed by: UROLOGY

## 2024-09-24 PROCEDURE — 1159F MED LIST DOCD IN RCRD: CPT | Mod: CPTII,S$GLB,, | Performed by: UROLOGY

## 2024-09-24 PROCEDURE — 3074F SYST BP LT 130 MM HG: CPT | Mod: CPTII,S$GLB,, | Performed by: UROLOGY

## 2024-09-24 PROCEDURE — 1160F RVW MEDS BY RX/DR IN RCRD: CPT | Mod: CPTII,S$GLB,, | Performed by: UROLOGY

## 2024-09-24 RX ORDER — TESTOSTERONE CYPIONATE 200 MG/ML
INJECTION, SOLUTION INTRAMUSCULAR
COMMUNITY
Start: 2024-08-24

## 2024-09-24 RX ORDER — VARDENAFIL HYDROCHLORIDE 20 MG/1
20 TABLET ORAL
COMMUNITY

## 2024-10-01 ENCOUNTER — TELEPHONE (OUTPATIENT)
Dept: UROLOGY | Facility: CLINIC | Age: 59
End: 2024-10-01
Payer: COMMERCIAL

## 2024-10-01 NOTE — TELEPHONE ENCOUNTER
I returned the pt call to inform him that  had sent his prescription to [facility]. The pt is aware.

## 2024-10-01 NOTE — TELEPHONE ENCOUNTER
----- Message from Emmanuel sent at 10/1/2024 10:48 AM CDT -----  Contact: pt  Type:  RX Refill Request    Who Called: pt   Refill or New Rx:new  RX Name and Strength:Trimix injection   Preferred Pharmacy with phone number:[facility] - [place] - [id number] [facility] [place] [address]  Phone: [phone] Fax: [phone]  Local or Mail Order:local  Ordering Provider:Maryanne   Would the patient rather a call back or a response via [facility]? call  Best Call Back Number:[phone]  Additional Information:   Pt stated this 4th calling for medication due to he have to bring it to appt on 10/03

## 2024-10-03 ENCOUNTER — OFFICE VISIT (OUTPATIENT)
Dept: UROLOGY | Facility: CLINIC | Age: 59
End: 2024-10-03
Payer: COMMERCIAL

## 2024-10-03 VITALS
SYSTOLIC BLOOD PRESSURE: 123 MMHG | DIASTOLIC BLOOD PRESSURE: 82 MMHG | HEART RATE: 76 BPM | HEIGHT: 72 IN | BODY MASS INDEX: 25.24 KG/M2 | WEIGHT: 186.38 LBS

## 2024-10-03 DIAGNOSIS — N52.01 ERECTILE DYSFUNCTION DUE TO ARTERIAL INSUFFICIENCY: Primary | ICD-10-CM

## 2024-10-03 PROCEDURE — 99999 PR PBB SHADOW E&M-EST. PATIENT-LVL III: CPT | Mod: PBBFAC,,,

## 2024-10-03 PROCEDURE — 99215 OFFICE O/P EST HI 40 MIN: CPT | Mod: S$GLB,,,

## 2024-10-03 PROCEDURE — 1159F MED LIST DOCD IN RCRD: CPT | Mod: CPTII,S$GLB,,

## 2024-10-03 PROCEDURE — 1160F RVW MEDS BY RX/DR IN RCRD: CPT | Mod: CPTII,S$GLB,,

## 2024-10-03 PROCEDURE — 3008F BODY MASS INDEX DOCD: CPT | Mod: CPTII,S$GLB,,

## 2024-10-03 PROCEDURE — 3074F SYST BP LT 130 MM HG: CPT | Mod: CPTII,S$GLB,,

## 2024-10-03 PROCEDURE — 3079F DIAST BP 80-89 MM HG: CPT | Mod: CPTII,S$GLB,,

## 2024-10-03 NOTE — PROGRESS NOTES
Subjective:       Patient ID: Faustino Shook is a 59 y.o. male.    Chief Complaint: ICI teaching     This is a 59 y.o.  male patient that is new to me but not new to the system.  This is a patient that was seen by Dr. Madden a week ago.  Vasectomy about 25 years ago to have vasectomy reversal at [facility].    On trimix pge 20 mcg/pap 30/phen 2, using 50 units, from Dr. Frederick Crum.  States about 60% erection after use, lasts about 15 minutes.  Used cialis/viagra/levitra in past, had side effects and wasn't working.     Here today for trimix teaching, dosage increased will start dose at 20 units in clinic today. Patient brought own bottle of medication from home. Endorses no other complaints today.        Lab Results   Component Value Date    CREATININE 1.3 08/21/2018       ---  PMH/PSH/Medications/Allergies/Social history reviewed and as in chart.    Review of Systems   Constitutional:  Negative for activity change, chills and fever.   Respiratory:  Negative for shortness of breath.    Cardiovascular:  Negative for chest pain and palpitations.   Gastrointestinal:  Negative for abdominal pain and constipation.   Genitourinary:  Negative for difficulty urinating, dysuria, flank pain, frequency, hematuria and urgency.   Neurological:  Negative for dizziness and light-headedness.       Objective:      Physical Exam  HENT:      Head: Normocephalic.   Pulmonary:      Effort: Pulmonary effort is normal.   Abdominal:      General: Abdomen is flat.      Palpations: Abdomen is soft.   Genitourinary:     Penis: Normal and uncircumcised.       Comments: 20 units of trimix injected to the right side of the penis at a 90 degree angle. Patient tolerated well.  Musculoskeletal:         General: Normal range of motion.      Cervical back: Normal range of motion.   Skin:     General: Skin is warm and dry.   Neurological:      Mental Status: He is alert and oriented to person, place, and time.         Assessment:     Problem  Noted   Erectile Dysfunction Due to Arterial Insufficiency 2/27/2014    ICI injections- 20 units         Plan:     We discussed ED and the contributing factors. We reviewed his personal factors that contribute to ED. Patient was educated on ED treatments. We discussed PDE-5 inhibitors, BENJAMIN, Muse, and IPP.  He is here today for the Intracorporal injection/ICI education.  Educational materials were supplied.   The medication has to be stored in the refrigerator. Improper storage decreases the effectiveness of the medication.   He was educated that it is an injection. With any injection there is risk for possible pain at the injection site as well as risk for an infection. Clean technique must be followed to help prevent infection. Proper needle disposable is necessary. He voices understanding.  The injection is best given when standing up and the penis is positioned perpendicular to the body. The urethral opening should be facing away from the body. Injection is always given on the lateral or side of the penis. Never give the injection to the top of the penis to avoid possible trauma to arteries and veins. Never give the injection to the bottom of the penis to avoid trauma to the urethra. He should alternate the injection sites to avoid the build up of scar tissue due to multiple injections.  This medication can increase the risk of erections lasting longer than 4 hours. This is known as a priapism and is a medical emergency. This requires immediate medical attention. This is main reason we give the first dose in the office today. We start at low dose and see how well the patients reacts. We can increase the medication if needed depending on the reaction the patient receives today. He voices understanding.  Correctly naye up the initial dose of 20 units and he injected himself in the right lateral aspect of the penis. Within 15 minutes the achieved an erection and prior to leaving clinic erection had resolved.   He  was instructed to keep the dosage at 20 units. If noticing a decrease in reaction he can increase the dosage by 5 units.   If have any questions, please give me a call. Educational materials were given to patient and all questions were answered. He voiced understanding and left the office without a priapism. Follow up 3 months.      AGUUSTO Ridley    I spent a total of 40 minutes on the day of the visit.This includes face to face time and non-face to face time preparing to see the patient (eg, review of tests), obtaining and/or reviewing separately obtained history, documenting clinical information in the electronic or other health record, independently interpreting results and communicating results to the patient/family/caregiver, or care coordinator.

## 2024-10-03 NOTE — PATIENT INSTRUCTIONS
Inject 20 units 15 to 20 minutes prior to sexual intercourse.  Can increase dose by 5 units, do not exceed 50 units.  Do not inject more than once in a 24 hour period.  Do not inject more than 3 times in 1 week.  If you have an erection for longer than 4 hours go to the ED.    Penile Self-Injection Procedure  Self-injection is a good option for many men with erectile dysfunction (ED). A tiny needle is used to inject medication into the penis. This helps your penis get hard and stay that way long enough for sex. And sex and orgasm will feel as good as always. You may be nervous about doing self-injection at first. But with practice, it will get easier. Your healthcare provider will show you how to do self-injection the first time. The simple steps are outlined on this sheet.  Preparing for Injection  Wash your hands well with soap and water.  Prepare the medication (if needed).  Sit or  a comfortable position in a warm, well-lit room. If you need to, sit or  front of a mirror.  Find an injection site on one side of your penis, in a place with no visible veins. (Dont inject into the top, bottom, or head of the penis.)  Clean the injection site with an alcohol swab. Grasp the head of your penis firmly with your thumb and forefinger (dont just pinch the skin). Stretch the penis so the skin on the shaft is taut.  Injecting the Medication  Rest your penis against your inner thigh and pull it gently toward your knee. Dont twist or rotate it. This way youll be sure to inject the medication into the spot you chose and cleaned before.  Hold the syringe between your thumb and fingers, like youre holding a pen. Rest your forearm on your thigh for support.  Insert the needle at a 90-degree angle (perpendicular) to the shaft. Do this quickly to reduce discomfort. (The needle should go in easily. If it doesnt, stop right away.)  Move your thumb to the plunger. Press down to inject the medication, counting to  5.  Remove the needle and dispose of it safely.     The injection site can be in any part of the shaded area.     Insert the needle straight into the penis.    Gaining an Erection  Apply pressure to the injection site for a few minutes. This prevents swelling and bruising and helps spread the medication.   Stand up. This may help your erection develop. Foreplay often helps, too.  Your penis should become firm within 10-20 minutes. The erection will last long enough for sex, and maybe longer.  Call Your Doctor If You Have:   An erection that lasts longer than 3-4  hours  Bleeding or bruising  Severe pain  Scarring or curvature of the penis       © 3055-2470 [facility], [address] [place] [address], [place] [address]. All rights reserved. This information is not intended as a substitute for professional medical care. Always follow your healthcare professional's instructions.

## 2024-10-12 ENCOUNTER — TELEPHONE (OUTPATIENT)
Dept: ENDOSCOPY | Facility: HOSPITAL | Age: 59
End: 2024-10-12
Payer: COMMERCIAL

## 2024-10-12 NOTE — TELEPHONE ENCOUNTER
----- Message from Josefina sent at 9/3/2024 11:50 AM CDT -----  Regarding: FW: colonoscopy  Please follow-up on this request ASAP.    Thanks!  ----- Message -----  From: Evelin Pyle  Sent: 9/3/2024   9:29 AM CDT  To: [facility] Schedul[facility]  Subject: FW: colonoscopy                                  Hi   Can you plz advise update on appt?     If your dept is running behind plz advise and provide ETA so that I can info my mgmt and to ensure you dont receive repeat messages.     Thanks,  Evelin [facility]      8/20/2024  3:09 PM Done Josefina Easley (Pool)  8/27/2024 11:09 AM Done Josefina Easley (Pool)  ----- Message -----  From: Evelin Pyle  Sent: 8/27/2024   8:50 AM CDT  To: [facility] Schedul[facility]  Subject: FW: colonoscopy                                  Can you plz advise appt status?     8/20/2024  3:09 PM Done Josefina Easley (Pool)    Thank you,  Evelin  ----- Message -----  From: Evelin Pyle  Sent: 8/20/2024   1:42 PM CDT  To: [facility] Schedul[facility]  Subject: colonoscopy                                      [facility] [place]    Ordering Colonoscopy     Plz ctc pt to schedule     Order/records in media     Thx  [facility]

## 2024-12-03 ENCOUNTER — OFFICE VISIT (OUTPATIENT)
Dept: UROLOGY | Facility: CLINIC | Age: 59
End: 2024-12-03
Payer: COMMERCIAL

## 2024-12-03 VITALS
SYSTOLIC BLOOD PRESSURE: 142 MMHG | HEART RATE: 90 BPM | WEIGHT: 184.75 LBS | BODY MASS INDEX: 25.02 KG/M2 | DIASTOLIC BLOOD PRESSURE: 80 MMHG | HEIGHT: 72 IN

## 2024-12-03 DIAGNOSIS — N52.01 ERECTILE DYSFUNCTION DUE TO ARTERIAL INSUFFICIENCY: Primary | ICD-10-CM

## 2024-12-03 PROCEDURE — 99999 PR PBB SHADOW E&M-EST. PATIENT-LVL III: CPT | Mod: PBBFAC,,, | Performed by: UROLOGY

## 2024-12-03 PROCEDURE — 3079F DIAST BP 80-89 MM HG: CPT | Mod: CPTII,S$GLB,, | Performed by: UROLOGY

## 2024-12-03 PROCEDURE — 3008F BODY MASS INDEX DOCD: CPT | Mod: CPTII,S$GLB,, | Performed by: UROLOGY

## 2024-12-03 PROCEDURE — 99213 OFFICE O/P EST LOW 20 MIN: CPT | Mod: S$GLB,,, | Performed by: UROLOGY

## 2024-12-03 PROCEDURE — 1159F MED LIST DOCD IN RCRD: CPT | Mod: CPTII,S$GLB,, | Performed by: UROLOGY

## 2024-12-03 PROCEDURE — 1160F RVW MEDS BY RX/DR IN RCRD: CPT | Mod: CPTII,S$GLB,, | Performed by: UROLOGY

## 2024-12-03 PROCEDURE — 3077F SYST BP >= 140 MM HG: CPT | Mod: CPTII,S$GLB,, | Performed by: UROLOGY

## 2024-12-03 NOTE — PROGRESS NOTES
Subjective:       Patient ID: Faustino Shook is a 59 y.o. male.    Chief Complaint: ICI teaching     This is a 59 y.o.  male patient that is new to me but not new to the system.  This is a patient that was seen by Dr. Madden a week ago.  Vasectomy about 25 years ago to have vasectomy reversal at [facility].    On trimix pge 20 mcg/pap 30/phen 2, using 50 units, from Dr. Frederick Crum.  States about 60% erection after use, lasts about 15 minutes.  Used cialis/viagra/levitra in past, had side effects and wasn't working.    Trimix teaching done with alprost 50, phento 5, papa 30 and started 20 units now up to 50 units.  Erection lasting for 20 minutes.          Lab Results   Component Value Date    CREATININE 1.3 08/21/2018       ---  PMH/PSH/Medications/Allergies/Social history reviewed and as in chart.    Review of Systems   Constitutional:  Negative for activity change, chills and fever.   Respiratory:  Negative for shortness of breath.    Cardiovascular:  Negative for chest pain and palpitations.   Gastrointestinal:  Negative for abdominal pain and constipation.   Genitourinary:  Negative for difficulty urinating, dysuria, flank pain, frequency, hematuria and urgency.   Neurological:  Negative for dizziness and light-headedness.       Objective:      Physical Exam  HENT:      Head: Normocephalic.   Pulmonary:      Effort: Pulmonary effort is normal.   Abdominal:      General: Abdomen is flat.      Palpations: Abdomen is soft.   Genitourinary:     Penis: Normal and uncircumcised.    Musculoskeletal:         General: Normal range of motion.      Cervical back: Normal range of motion.   Skin:     General: Skin is warm and dry.   Neurological:      Mental Status: He is alert and oriented to person, place, and time.       Assessment:     Problem Noted   S/P Vasectomy 9/24/2024   Erectile Dysfunction Due to Arterial Insufficiency 2/27/2014    ICI injections- 50 units         Plan:     To discuss IPP with   Андрей, can adjust ICI with him as well, defer to Dr. Chiang  Follow up PRNIKKI Madden MD    I spent a total of 40 minutes on the day of the visit.This includes face to face time and non-face to face time preparing to see the patient (eg, review of tests), obtaining and/or reviewing separately obtained history, documenting clinical information in the electronic or other health record, independently interpreting results and communicating results to the patient/family/caregiver, or care coordinator.